# Patient Record
Sex: MALE | Race: WHITE | NOT HISPANIC OR LATINO | Employment: FULL TIME | URBAN - METROPOLITAN AREA
[De-identification: names, ages, dates, MRNs, and addresses within clinical notes are randomized per-mention and may not be internally consistent; named-entity substitution may affect disease eponyms.]

---

## 2017-01-02 ENCOUNTER — MYC MEDICAL ADVICE (OUTPATIENT)
Dept: FAMILY MEDICINE | Facility: CLINIC | Age: 37
End: 2017-01-02

## 2017-01-06 ENCOUNTER — OFFICE VISIT (OUTPATIENT)
Dept: FAMILY MEDICINE | Facility: CLINIC | Age: 37
End: 2017-01-06
Payer: COMMERCIAL

## 2017-01-06 VITALS
TEMPERATURE: 97.1 F | HEART RATE: 94 BPM | SYSTOLIC BLOOD PRESSURE: 116 MMHG | HEIGHT: 70 IN | OXYGEN SATURATION: 94 % | RESPIRATION RATE: 18 BRPM | WEIGHT: 234 LBS | DIASTOLIC BLOOD PRESSURE: 78 MMHG | BODY MASS INDEX: 33.5 KG/M2

## 2017-01-06 DIAGNOSIS — S93.402S SPRAIN OF LEFT ANKLE, UNSPECIFIED LIGAMENT, SEQUELA: Primary | ICD-10-CM

## 2017-01-06 PROCEDURE — 99213 OFFICE O/P EST LOW 20 MIN: CPT | Performed by: FAMILY MEDICINE

## 2017-01-06 ASSESSMENT — PAIN SCALES - GENERAL: PAINLEVEL: MODERATE PAIN (4)

## 2017-01-06 NOTE — MR AVS SNAPSHOT
"              After Visit Summary   1/6/2017    Tereso Erickson    MRN: [id number]           Patient Information     Date Of Birth          1980        Visit Information        Provider Department      1/6/2017 1:00 PM Noe Ernst MD [facility]         Follow-ups after your visit        Who to contact     If you have questions or need follow up information about today's clinic visit or your schedule please contact [facility] directly at [phone].  Normal or non-critical lab and imaging results will be communicated to you by MyChart, letter or phone within 4 business days after the clinic has received the results. If you do not hear from us within 7 days, please contact the clinic through [employer] or phone. If you have a critical or abnormal lab result, we will notify you by phone as soon as possible.  Submit refill requests through [employer] or call your pharmacy and they will forward the refill request to us. Please allow 3 business days for your refill to be completed.          Additional Information About Your Visit        MyChart Information     [employer] gives you secure access to your electronic health record. If you see a primary care provider, you can also send messages to your care team and make appointments. If you have questions, please call your primary care clinic.  If you do not have a primary care provider, please call [phone] and they will assist you.        Care EveryWhere ID     This is your Care EveryWhere ID. This could be used by other organizations to access your [facility] medical records  [id number]        Your Vitals Were     Pulse Temperature Respirations Height BMI (Body Mass Index) Pulse Oximetry    94 97.1  F (36.2  C) (Tympanic) 18 5' 9.8\" (1.773 m) 33.77 kg/m2 94%       Blood Pressure from Last 3 Encounters:   01/06/17 116/78   01/28/16 126/74   01/09/16 119/79    Weight from Last 3 Encounters:   01/06/17 234 lb (106.142 kg)   01/28/16 " 230 lb (104.327 kg)   01/09/16 215 lb (97.523 kg)              Today, you had the following     No orders found for display       Primary Care Provider Office Phone # Fax #    Noe Ernst [phone]       [facility] [address] [place] [address]        Thank you!     Thank you for choosing [facility]  for your care. Our goal is always to provide you with excellent care. Hearing back from our patients is one way we can continue to improve our services. Please take a few minutes to complete the written survey that you may receive in the mail after your visit with us. Thank you!             Your Updated Medication List - Protect others around you: Learn how to safely use, store and throw away your medicines at www.disposemymeds.org.          This list is accurate as of: 1/6/17  4:34 PM.  Always use your most recent med list.                   Brand Name Dispense Instructions for use    ACETAMINOPHEN PO      Take 650 mg by mouth every 4 hours as needed for pain       IBUPROFEN PO

## 2017-01-06 NOTE — PROGRESS NOTES
"  SUBJECTIVE:                                                    Tereso Erickson is a 36 year old male who presents to clinic today for the following health issues:      Joint Pain     Onset: 1 week      Description:   Location: left foot   Character: Sharp, Dull ache and Stabbing    Intensity: moderate    Progression of Symptoms: better    Accompanying Signs & Symptoms:  Other symptoms: numbness and tingling   History:   Previous similar pain: no       Precipitating factors:   Trauma or overuse: no     Alleviating factors:  Improved by: rest/inactivity       Therapies Tried and outcome: ibuprofen ,ice    Ran on the tredmil on 12-31-16 (6 days ago), no issues, went to the grocery store, and stepped to put his son in the car and he felt like a spike went through it. Sharp pain, burning/hot.    It was painful for awhile, but, the pain went away, they went bowling same day and he was still fine.    Woke up next day and it was hard to walk and very painful     He states that it varies from day to day how long he goes without pain.  The majority of the pain is on the lateral aspect of the ankle.  Some pain at the plantar aspect of his foot at the origin of the plantar fascia.      He has tried ice, and he doesn't feel any difference.     He has no pain while at rest, but, varying degrees of pain while standing or walking.  Pain is usually worse when he is on his foot longer and longer.  Rarely does he have pain with first step after awakening or after long periods of inactivity.      Left foot pain is Gradually getting better.    Patient notes that he has had multiple previous left ankle sprains.  Nothing recently.      ROS:  General: negative  Musculoskeletal: positive for negative and foot pain    Review of systems assessed and negative except as stated above.     OBJECTIVE:                                                    /78 mmHg  Pulse 94  Temp(Src) 97.1  F (36.2  C) (Tympanic)  Resp 18  Ht 5' 9.8\" (1.773 " m)  Wt 234 lb (106.142 kg)  BMI 33.77 kg/m2  SpO2 94%  Body mass index is 33.77 kg/(m^2).  GENERAL APPEARANCE: healthy, alert and no distress  ORTHO: Foot Exam: Inspection:  no swelling  Tender::  At origin of plantar fascia as well as lateral malleolus (pain is worse here).    Range of Motion:flexion of toes:  full, pain-free, extension of toes  full, pain-free          ASSESSMENT/PLAN:                                                        ICD-10-CM    1. Sprain of left ankle, unspecified ligament, sequela S93.402S      PLAN:  1. Patient likely has sequela of ankle that has sustained multiple ankle sprains.  He is experiencing pain as a result of a poorly rehabbed ankle.  Recommend that if he wants to continue to do treadmill, he will need to go to physical therapy for strengthening of his ankle.  He will think about this and let me know if he wishes to get a referral to physical therapy.    Follow up with me as needed.    This document serves as a record of the services and decisions personally performed and made by Noe Ernst MD.It was created on his behalf by Lalita Ramos,  trained medical scribes. The creation of this document is based the provider's statements to the medical scribe. January 6, 2017 1:28 PM    The information in this document, created by the medical scribe for me, accurately reflects the services I personally performed and the decisions made by me. I have reviewed and approved this document for accuracy.     Noe Ernst MD   [facility]

## 2017-01-06 NOTE — NURSING NOTE
"Chief Complaint   Patient presents with     Musculoskeletal Problem     left foot pain       Initial There were no vitals taken for this visit. Estimated body mass index is 33.95 kg/(m^2) as calculated from the following:    Height as of 1/28/16: 5' 9\" (1.753 m).    Weight as of 1/28/16: 230 lb (104.327 kg).  BP completed using cuff size: chris Vasquez MA      "

## 2017-10-11 ENCOUNTER — MYC MEDICAL ADVICE (OUTPATIENT)
Dept: FAMILY MEDICINE | Facility: CLINIC | Age: 37
End: 2017-10-11

## 2017-10-11 ENCOUNTER — OFFICE VISIT (OUTPATIENT)
Dept: FAMILY MEDICINE | Facility: CLINIC | Age: 37
End: 2017-10-11
Payer: COMMERCIAL

## 2017-10-11 VITALS
TEMPERATURE: 97.6 F | WEIGHT: 239.6 LBS | OXYGEN SATURATION: 95 % | BODY MASS INDEX: 34.58 KG/M2 | SYSTOLIC BLOOD PRESSURE: 110 MMHG | DIASTOLIC BLOOD PRESSURE: 70 MMHG | HEART RATE: 60 BPM

## 2017-10-11 DIAGNOSIS — H66.006 RECURRENT ACUTE SUPPURATIVE OTITIS MEDIA WITHOUT SPONTANEOUS RUPTURE OF TYMPANIC MEMBRANE OF BOTH SIDES: Primary | ICD-10-CM

## 2017-10-11 DIAGNOSIS — M77.02 MEDIAL EPICONDYLITIS OF ELBOW, LEFT: ICD-10-CM

## 2017-10-11 DIAGNOSIS — M77.01 MEDIAL EPICONDYLITIS OF ELBOW, RIGHT: ICD-10-CM

## 2017-10-11 PROCEDURE — 99214 OFFICE O/P EST MOD 30 MIN: CPT | Performed by: PHYSICIAN ASSISTANT

## 2017-10-11 ASSESSMENT — ENCOUNTER SYMPTOMS
DYSURIA: 0
EYE REDNESS: 0
SEIZURES: 0
FREQUENCY: 0
BLOOD IN STOOL: 0
MYALGIAS: 0
DIARRHEA: 0
DIAPHORESIS: 0
EYE PAIN: 0
COUGH: 0
LOSS OF CONSCIOUSNESS: 0
DOUBLE VISION: 0
DIZZINESS: 0
PHOTOPHOBIA: 0
NEUROLOGICAL NEGATIVE: 1
EYE DISCHARGE: 0
BLURRED VISION: 0
HALLUCINATIONS: 0
ABDOMINAL PAIN: 0
WEIGHT LOSS: 0
NAUSEA: 0
PALPITATIONS: 0
INSOMNIA: 0
NECK PAIN: 0
FEVER: 0
HEARTBURN: 0
BACK PAIN: 0
DEPRESSION: 0
FOCAL WEAKNESS: 0
TINGLING: 0
SORE THROAT: 0
WHEEZING: 0
NERVOUS/ANXIOUS: 0
SENSORY CHANGE: 0
HEMOPTYSIS: 0
SPUTUM PRODUCTION: 0
HEADACHES: 0
WEAKNESS: 0
ORTHOPNEA: 0
CONSTIPATION: 0
SHORTNESS OF BREATH: 0
VOMITING: 0

## 2017-10-11 ASSESSMENT — LIFESTYLE VARIABLES: SUBSTANCE_ABUSE: 0

## 2017-10-11 ASSESSMENT — PAIN SCALES - GENERAL: PAINLEVEL: SEVERE PAIN (7)

## 2017-10-11 NOTE — NURSING NOTE
"Chief Complaint   Patient presents with     Headache     Elbow Pain       Initial /70 (BP Location: Right arm, Patient Position: Chair, Cuff Size: Adult Large)  Pulse 60  Temp 97.6  F (36.4  C) (Tympanic)  Wt 239 lb 9.6 oz (108.7 kg)  SpO2 95%  BMI 34.58 kg/m2 Estimated body mass index is 34.58 kg/(m^2) as calculated from the following:    Height as of 1/6/17: 5' 9.8\" (1.773 m).    Weight as of this encounter: 239 lb 9.6 oz (108.7 kg).  Medication Reconciliation: complete    Health Maintenance that is potentially due pending provider review:  NONE    n/a    Is there anyone who you would like to be able to receive your results? Yes  If yes have patient fill out VIKRAM    Debbie BECK CMA    "

## 2017-10-11 NOTE — MR AVS SNAPSHOT
After Visit Summary   10/11/2017    Tereso Erickson    MRN: [id number]           Patient Information     Date Of Birth          1980        Visit Information        Provider Department      10/11/2017 2:40 PM Parvez Patterson PA-C [facility]        Today's Diagnoses     Recurrent acute suppurative otitis media without spontaneous rupture of tympanic membrane of both sides    -  1    Medial epicondylitis of elbow, right        Medial epicondylitis of elbow, left           Follow-ups after your visit        Follow-up notes from your care team     Return if symptoms worsen or fail to improve.      Who to contact     If you have questions or need follow up information about today's clinic visit or your schedule please contact [facility] directly at [phone].  Normal or non-critical lab and imaging results will be communicated to you by MyChart, letter or phone within 4 business days after the clinic has received the results. If you do not hear from us within 7 days, please contact the clinic through MyChart or phone. If you have a critical or abnormal lab result, we will notify you by phone as soon as possible.  Submit refill requests through [employer] or call your pharmacy and they will forward the refill request to us. Please allow 3 business days for your refill to be completed.          Additional Information About Your Visit        MyChart Information     [employer] gives you secure access to your electronic health record. If you see a primary care provider, you can also send messages to your care team and make appointments. If you have questions, please call your primary care clinic.  If you do not have a primary care provider, please call [phone] and they will assist you.        Care EveryWhere ID     This is your Care EveryWhere ID. This could be used by other organizations to access your [place] medical records  [id number]        Your Vitals Were      Pulse Temperature Pulse Oximetry BMI (Body Mass Index)          60 97.6  F (36.4  C) (Tympanic) 95% 34.58 kg/m2         Blood Pressure from Last 3 Encounters:   10/11/17 110/70   01/06/17 116/78   01/28/16 126/74    Weight from Last 3 Encounters:   10/11/17 239 lb 9.6 oz (108.7 kg)   01/06/17 234 lb (106.1 kg)   01/28/16 230 lb (104.3 kg)              Today, you had the following     No orders found for display         Today's Medication Changes          These changes are accurate as of: 10/11/17  4:42 PM.  If you have any questions, ask your nurse or doctor.               Start taking these medicines.        Dose/Directions    amoxicillin-clavulanate 875-125 MG per tablet   Commonly known as:  AUGMENTIN   Used for:  Recurrent acute suppurative otitis media without spontaneous rupture of tympanic membrane of both sides   Started by:  Parvez Patterson PA-C        Dose:  1 tablet   Take 1 tablet by mouth 2 times daily   Quantity:  20 tablet   Refills:  0       diclofenac 50 MG EC tablet   Commonly known as:  VOLTAREN   Used for:  Medial epicondylitis of elbow, right, Medial epicondylitis of elbow, left   Started by:  Parvez Patterson PA-C        Dose:  50 mg   Take 1 tablet (50 mg) by mouth 3 times daily as needed for moderate pain   Quantity:  60 tablet   Refills:  1            Where to get your medicines      These medications were sent to [address] [address]     Phone:  [phone]     amoxicillin-clavulanate 875-125 MG per tablet    diclofenac 50 MG EC tablet                Primary Care Provider Office Phone # Fax #    Noe Ernst [phone]       [address] [place] [address]        Equal Access to Services     [facility] OPAL AH: Jey Rodriguez, wanishant mcmillan, qaybta kaalmaadeola wagner, trini reeves. So [facility] [phone].    ATENCIÓN: Misti hopkins  español, tiene a smith disposición servicios gratuitos de asistencia lingüística. Sp brennan [phone].    We comply with applicable federal civil rights laws and [place] laws. We do not discriminate on the basis of race, color, national origin, age, disability, sex, sexual orientation, or gender identity.            Thank you!     Thank you for choosing [facility]  for your care. Our goal is always to provide you with excellent care. Hearing back from our patients is one way we can continue to improve our services. Please take a few minutes to complete the written survey that you may receive in the mail after your visit with us. Thank you!             Your Updated Medication List - Protect others around you: Learn how to safely use, store and throw away your medicines at www.disposemymeds.org.          This list is accurate as of: 10/11/17  4:42 PM.  Always use your most recent med list.                   Brand Name Dispense Instructions for use Diagnosis    ACETAMINOPHEN PO      Take 650 mg by mouth every 4 hours as needed for pain        amoxicillin-clavulanate 875-125 MG per tablet    AUGMENTIN    20 tablet    Take 1 tablet by mouth 2 times daily    Recurrent acute suppurative otitis media without spontaneous rupture of tympanic membrane of both sides       diclofenac 50 MG EC tablet    VOLTAREN    60 tablet    Take 1 tablet (50 mg) by mouth 3 times daily as needed for moderate pain    Medial epicondylitis of elbow, right, Medial epicondylitis of elbow, left       IBUPROFEN PO

## 2017-10-11 NOTE — TELEPHONE ENCOUNTER
Rn called left message on identified phone that he should probably be seen for his concern. Dr. Ernst has some maddie available tomorrow. He should call and schedule appt or if questions, call back and ask to speak with the nurse., [phone].....................JOSEPH Alexandra

## 2017-10-11 NOTE — PROGRESS NOTES
HPI    SUBJECTIVE:   Tereso Erickson is a 37 year old male who presents to clinic today for ear pain is present for the past 5-7 days. Mainly on the right side. had no fevers associated with this.  He also has bilateral elbow pain and he points to the medial epicondyles bilaterally and states that this started after he was skipping stones and he was using both arms.    ENT Symptoms             Symptoms: cc Present Absent Comment   Fever/Chills   x    Fatigue  x     Muscle Aches   x    Eye Irritation   x    Sneezing   x    Nasal Jalen/Drg  x     Sinus Pressure/Pain  x  Some. Patient states that he has had a constant headache for about 2 weeks. Says that at times feels like a migraine    Loss of smell   x    Dental pain   x    Sore Throat  x     Swollen Glands   x    Ear Pain/Fullness  x  Right ear. Throbbing    Cough   x    Wheeze   x    Chest Pain   x    Shortness of breath   x    Rash       Other         Symptom duration: Since Thursday last week    Symptom severity:     Treatments tried:     Contacts:  Family         Musculoskeletal problem/pain      Duration: Since about Mid August     Description  Location: Insides of both elbows. Right seems to be worse     Intensity:  moderate, severe    Accompanying signs and symptoms: none    History  Previous similar problem: no   Previous evaluation:  none    Precipitating or alleviating factors:  Trauma or overuse: YES  Aggravating factors include: none    Therapies tried and outcome: nothing    Problem list and histories reviewed & adjusted, as indicated.  Additional history: as documented    Patient Active Problem List   Diagnosis     GERD (gastroesophageal reflux disease)     CARDIOVASCULAR SCREENING; LDL GOAL LESS THAN 160     Mac's neuroma     Impingement syndrome of shoulder region     Strain of tendon of right rotator cuff     Past Surgical History:   Procedure Laterality Date     BIOPSY/REMOVAL, LYMPH NODE(S)      groin area at age 19-benign       Social History    Substance Use Topics     Smoking status: Former Smoker     Quit date: 1/1/2005     Smokeless tobacco: Former User     Quit date: 1/1/2005     Alcohol use Yes      Comment: occasionally a beer once a month     Family History   Problem Relation Age of Onset     GASTROINTESTINAL DISEASE Mother      Crohn's     Cancer - colorectal Mother      Prostate Cancer Mother      CEREBROVASCULAR DISEASE Paternal Grandmother      CANCER Maternal Grandmother      lymphoma     Respiratory Maternal Grandfather      COPD         Current Outpatient Prescriptions   Medication Sig Dispense Refill     amoxicillin-clavulanate (AUGMENTIN) 875-125 MG per tablet Take 1 tablet by mouth 2 times daily 20 tablet 0     diclofenac (VOLTAREN) 50 MG EC tablet Take 1 tablet (50 mg) by mouth 3 times daily as needed for moderate pain 60 tablet 1     ACETAMINOPHEN PO Take 650 mg by mouth every 4 hours as needed for pain       IBUPROFEN PO        No Known Allergies  Labs reviewed in EPIC      Reviewed and updated as needed this visit by clinical staff     Reviewed and updated as needed this visit by Provider       Review of Systems   Constitutional: Negative for diaphoresis, fever, malaise/fatigue and weight loss.   HENT: Positive for congestion and ear pain. Negative for ear discharge, hearing loss, nosebleeds and sore throat.    Eyes: Negative for blurred vision, double vision, photophobia, pain, discharge and redness.   Respiratory: Negative for cough, hemoptysis, sputum production, shortness of breath and wheezing.    Cardiovascular: Negative for chest pain, palpitations, orthopnea and leg swelling.   Gastrointestinal: Negative for abdominal pain, blood in stool, constipation, diarrhea, heartburn, melena, nausea and vomiting.   Genitourinary: Negative.  Negative for dysuria, frequency and urgency.   Musculoskeletal: Positive for joint pain. Negative for back pain, myalgias and neck pain.   Skin: Negative for itching and rash.   Neurological:  Negative.  Negative for dizziness, tingling, sensory change, focal weakness, seizures, loss of consciousness, weakness and headaches.   Endo/Heme/Allergies: Negative.    Psychiatric/Behavioral: Negative for depression, hallucinations, substance abuse and suicidal ideas. The patient is not nervous/anxious and does not have insomnia.          Physical Exam   Constitutional: He is oriented to person, place, and time and well-developed, well-nourished, and in no distress.   HENT:   Head: Normocephalic and atraumatic.   Right Ear: No drainage or tenderness. Tympanic membrane is injected, erythematous and bulging. Tympanic membrane mobility is abnormal. A middle ear effusion is present.   Left Ear: External ear normal. No drainage or tenderness. Tympanic membrane is injected, erythematous and bulging. Tympanic membrane mobility is abnormal. A middle ear effusion is present.   Nose: Nose normal.   Mouth/Throat: Oropharyngeal exudate, posterior oropharyngeal edema and posterior oropharyngeal erythema present.   Eyes: Conjunctivae and EOM are normal. Pupils are equal, round, and reactive to light. Right eye exhibits no discharge. Left eye exhibits no discharge. No scleral icterus.   Neck: Normal range of motion. Neck supple. No thyromegaly present.   Cardiovascular: Normal rate, regular rhythm, normal heart sounds and intact distal pulses.  Exam reveals no gallop and no friction rub.    No murmur heard.  Pulmonary/Chest: Effort normal and breath sounds normal. No respiratory distress. He has no wheezes. He has no rales. He exhibits no tenderness.   Abdominal: Soft. Bowel sounds are normal. He exhibits no distension and no mass. There is no tenderness. There is no rebound and no guarding.   Musculoskeletal: Normal range of motion. He exhibits no edema.        Right elbow: Tenderness found. Medial epicondyle tenderness noted.        Left elbow: Tenderness found. Medial epicondyle tenderness noted.   Lymphadenopathy:     He has  no cervical adenopathy.   Neurological: He is alert and oriented to person, place, and time. He has normal reflexes. No cranial nerve deficit. He exhibits normal muscle tone. Gait normal. Coordination normal.   Skin: Skin is warm and dry. No rash noted. No erythema.   Psychiatric: Mood, memory, affect and judgment normal.       (H66.006) Recurrent acute suppurative otitis media without spontaneous rupture of tympanic membrane of both sides  (primary encounter diagnosis)  Comment:   Plan: amoxicillin-clavulanate (AUGMENTIN) 875-125 MG         per tablet            (M77.01) Medial epicondylitis of elbow, right  Comment:   Plan: diclofenac (VOLTAREN) 50 MG EC tablet            (M77.02) Medial epicondylitis of elbow, left  Comment:   Plan: diclofenac (VOLTAREN) 50 MG EC tablet           Follow up if symptoms are not improving. We discussed possible physical therapy although pain is not improving.

## 2017-10-12 NOTE — TELEPHONE ENCOUNTER
Responded via My Chart and message left on identified voicemail to call if questions or to schedule an appt.   Daysi Payne RN

## 2017-12-18 ENCOUNTER — OFFICE VISIT (OUTPATIENT)
Dept: FAMILY MEDICINE | Facility: CLINIC | Age: 37
End: 2017-12-18
Payer: COMMERCIAL

## 2017-12-18 VITALS
HEART RATE: 78 BPM | BODY MASS INDEX: 34.2 KG/M2 | TEMPERATURE: 98 F | RESPIRATION RATE: 18 BRPM | SYSTOLIC BLOOD PRESSURE: 118 MMHG | WEIGHT: 237 LBS | OXYGEN SATURATION: 98 % | DIASTOLIC BLOOD PRESSURE: 72 MMHG

## 2017-12-18 DIAGNOSIS — R10.32 LLQ ABDOMINAL PAIN: ICD-10-CM

## 2017-12-18 DIAGNOSIS — K21.9 GASTROESOPHAGEAL REFLUX DISEASE WITHOUT ESOPHAGITIS: ICD-10-CM

## 2017-12-18 DIAGNOSIS — R10.12 LUQ ABDOMINAL PAIN: Primary | ICD-10-CM

## 2017-12-18 PROCEDURE — 99213 OFFICE O/P EST LOW 20 MIN: CPT | Performed by: FAMILY MEDICINE

## 2017-12-18 ASSESSMENT — PAIN SCALES - GENERAL: PAINLEVEL: NO PAIN (0)

## 2017-12-18 NOTE — PROGRESS NOTES
SUBJECTIVE:   Tereso Erickson is a 37 year old male who presents to clinic today for the following health issues:      ABDOMINAL   PAIN     Onset: Solid pain, pressure 1 week.     Description:   Character: Dull ache, Stabbing and Fullness  Location: epigastric region left upper quadrant  Radiation: Back and Shoulder    Intensity: mild    Progression of Symptoms:  worsening    Accompanying Signs & Symptoms:  Fever/Chills?: no   Gas/Bloating: YES  Nausea: YES  Vomitting: no   Diarrhea?: no   Constipation:no   Dysuria or Hematuria: no    History:   Trauma: no   Previous similar pain: no    Previous tests done: none    Precipitating factors:   Does the pain change with:     Food: no      BM: no     Urination: no     Alleviating factors:  Tums- calms the acid part.     Therapies Tried and outcome:     LMP:  not applicable       Problem list and histories reviewed & adjusted, as indicated.  Additional history: as documented    Reviewed and updated as needed this visit by clinical staffTobacco  Allergies  Meds  Problems  Med Hx  Surg Hx  Fam Hx  Soc Hx        Reviewed and updated as needed this visit by Provider  Allergies  Meds  Problems         Patient here to review above symptoms that started 1 week ago.  Patient has no previous history of heartburn.  He notes improvement with Tums.  However, she continues to have pain and this is concerning to him.  He notes that in addition to epigastric pain he also has left lower quadrant and left upper quadrant pain.  Patient reports no fevers, chills, vomiting.  No history of constipation or diarrhea or hematochezia.    Patient reports no melena, hematochezia, nausea/vomiting, difficulty swallowing, or sensations of food getting stuck.    Social History   Substance Use Topics     Smoking status: Former Smoker     Quit date: 1/1/2005     Smokeless tobacco: Former User     Quit date: 1/1/2005     Alcohol use Yes      Comment: occasionally a beer once a month         ROS:  10 point ROS of systems including Constitutional, Eyes, HENT, Respiratory, Cardiovascular, Gastroenterology, Genitourinary, Integumentary, Muscularskeletal, Psychiatric were all negative except for pertinent positives noted in my HPI.     OBJECTIVE:   /72  Pulse 78  Temp 98  F (36.7  C) (Temporal)  Resp 18  Wt 237 lb (107.5 kg)  SpO2 98%  BMI 34.2 kg/m2  Body mass index is 34.2 kg/(m^2).  Physical Exam   Constitutional: He appears well-developed and well-nourished.   Cardiovascular: Normal rate, regular rhythm, S1 normal, S2 normal and normal heart sounds.    No murmur heard.  Pulmonary/Chest: Effort normal and breath sounds normal. No respiratory distress. He has no wheezes. He has no rhonchi. He has no rales.   Abdominal: Soft. Normal appearance and bowel sounds are normal. He exhibits no mass. There is no hepatosplenomegaly. There is tenderness in the left upper quadrant and left lower quadrant. There is no rigidity, no rebound, no guarding, no CVA tenderness, no tenderness at McBurney's point and negative Bui's sign.   Negative for right lower quadrant, right upper quadrant and epigastric pain.     Neurological: He is alert.       ASSESSMENT/PLAN:       ICD-10-CM    1. LUQ abdominal pain R10.12    2. LLQ abdominal pain R10.32    3. Gastroesophageal reflux disease without esophagitis K21.9      PLAN:  1.  For now, he will monitor symptoms.  He will take ranitidine twice daily for 1-2 weeks to see if this helps his reported GERD symptoms.  Also recommended Miralax 1 capful daily for 1-2 weeks with increased fluid intake to see if constipation is causing his symptoms.     2.  If symptoms worsen or if he starts to get fevers or flu-like symptoms with the left lower quadrant pain, I would recommend that he follow-up for CRP, CBC with diff, lipase, and possibly CT of abdomen/pelvis for rule out of diverticulitis.  At this point, given that he feels relatively well (with exception of left lower  quadrant and left upper quadrant pain with lower left rib cage pain) I do not feel he needs additional workup at this time.    Follow up with Provider - in next 1-2 weeks if symptoms do not improve, sooner if they worsen (with follow-up labs and imaging as noted above).     Noe Ernst MD   [facility]

## 2017-12-18 NOTE — NURSING NOTE
"Chief Complaint   Patient presents with     Abdominal Pain     stomach issues, no appetite,        Initial /72  Pulse 78  Temp 98  F (36.7  C) (Temporal)  Resp 18  Wt 237 lb (107.5 kg)  SpO2 98%  BMI 34.2 kg/m2 Estimated body mass index is 34.2 kg/(m^2) as calculated from the following:    Height as of 1/6/17: 5' 9.8\" (1.773 m).    Weight as of this encounter: 237 lb (107.5 kg).  Medication Reconciliation: complete    "

## 2017-12-18 NOTE — PATIENT INSTRUCTIONS
Ranitidine 150 mg twice daily for the next 1-2 weeks.    1 capful of Miralax daily with  ounces of water per day.

## 2017-12-18 NOTE — MR AVS SNAPSHOT
After Visit Summary   12/18/2017    Tereso Erickson    MRN: [id number]           Patient Information     Date Of Birth          1980        Visit Information        Provider Department      12/18/2017 9:00 AM Noe Ernst MD [facility]        Today's Diagnoses     LUQ abdominal pain    -  1    LLQ abdominal pain        Gastroesophageal reflux disease without esophagitis          Care Instructions    Ranitidine 150 mg twice daily for the next 1-2 weeks.    1 capful of Miralax daily with  ounces of water per day.            Follow-ups after your visit        Who to contact     If you have questions or need follow up information about today's clinic visit or your schedule please contact [facility] directly at [phone].  Normal or non-critical lab and imaging results will be communicated to you by [employer], letter or phone within 4 business days after the clinic has received the results. If you do not hear from us within 7 days, please contact the clinic through [employer] or phone. If you have a critical or abnormal lab result, we will notify you by phone as soon as possible.  Submit refill requests through [employer] or call your pharmacy and they will forward the refill request to us. Please allow 3 business days for your refill to be completed.          Additional Information About Your Visit        MyChart Information     [employer] gives you secure access to your electronic health record. If you see a primary care provider, you can also send messages to your care team and make appointments. If you have questions, please call your primary care clinic.  If you do not have a primary care provider, please call [phone] and they will assist you.        Care EveryWhere ID     This is your Care EveryWhere ID. This could be used by other organizations to access your [place] medical records  [id number]        Your Vitals Were     Pulse Temperature  Respirations Pulse Oximetry BMI (Body Mass Index)       78 98  F (36.7  C) (Temporal) 18 98% 34.2 kg/m2        Blood Pressure from Last 3 Encounters:   12/18/17 118/72   10/11/17 110/70   01/06/17 116/78    Weight from Last 3 Encounters:   12/18/17 237 lb (107.5 kg)   10/11/17 239 lb 9.6 oz (108.7 kg)   01/06/17 234 lb (106.1 kg)              Today, you had the following     No orders found for display         Today's Medication Changes          These changes are accurate as of: 12/18/17  9:54 AM.  If you have any questions, ask your nurse or doctor.               Stop taking these medicines if you haven't already. Please contact your care team if you have questions.     amoxicillin-clavulanate 875-125 MG per tablet   Commonly known as:  AUGMENTIN   Stopped by:  Noe Ernst MD                    Primary Care Provider Office Phone # Fax #    Noe Ernst [phone] [address] [place] [address]        Equal Access to Services     [facility]: Hadii spencer ku hadasho Soomaali, waaxda luqadaha, qaybta kaalmada kristy, trini quinonez . So [facility] [phone].    ATENCIÓN: Si habla español, tiene a smith disposición servicios gratuitos de asistencia lingüística. [facility] [phone].    We comply with applicable federal civil rights laws and [place] laws. We do not discriminate on the basis of race, color, national origin, age, disability, sex, sexual orientation, or gender identity.            Thank you!     Thank you for choosing [facility]  for your care. Our goal is always to provide you with excellent care. Hearing back from our patients is one way we can continue to improve our services. Please take a few minutes to complete the written survey that you may receive in the mail after your visit with us. Thank you!             Your Updated Medication List - Protect others around you: Learn how to safely use, store and throw  away your medicines at www.disposemymeds.org.          This list is accurate as of: 12/18/17  9:54 AM.  Always use your most recent med list.                   Brand Name Dispense Instructions for use Diagnosis    ACETAMINOPHEN PO      Take 650 mg by mouth every 4 hours as needed for pain        diclofenac 50 MG EC tablet    VOLTAREN    60 tablet    Take 1 tablet (50 mg) by mouth 3 times daily as needed for moderate pain    Medial epicondylitis of elbow, right, Medial epicondylitis of elbow, left       IBUPROFEN PO

## 2018-03-27 ENCOUNTER — MYC MEDICAL ADVICE (OUTPATIENT)
Dept: FAMILY MEDICINE | Facility: CLINIC | Age: 38
End: 2018-03-27

## 2018-03-27 ENCOUNTER — OFFICE VISIT (OUTPATIENT)
Dept: FAMILY MEDICINE | Facility: CLINIC | Age: 38
End: 2018-03-27
Payer: COMMERCIAL

## 2018-03-27 VITALS
HEART RATE: 63 BPM | OXYGEN SATURATION: 99 % | BODY MASS INDEX: 35.55 KG/M2 | SYSTOLIC BLOOD PRESSURE: 128 MMHG | TEMPERATURE: 98.5 F | RESPIRATION RATE: 18 BRPM | DIASTOLIC BLOOD PRESSURE: 74 MMHG | HEIGHT: 69 IN | WEIGHT: 240 LBS

## 2018-03-27 DIAGNOSIS — R10.12 LUQ ABDOMINAL PAIN: Primary | ICD-10-CM

## 2018-03-27 DIAGNOSIS — K21.9 GASTROESOPHAGEAL REFLUX DISEASE WITHOUT ESOPHAGITIS: ICD-10-CM

## 2018-03-27 DIAGNOSIS — Z13.220 LIPID SCREENING: ICD-10-CM

## 2018-03-27 DIAGNOSIS — R07.89 LEFT-SIDED CHEST WALL PAIN: ICD-10-CM

## 2018-03-27 DIAGNOSIS — E66.01 SEVERE OBESITY (BMI 35.0-39.9) WITH COMORBIDITY (H): ICD-10-CM

## 2018-03-27 LAB
ALBUMIN SERPL-MCNC: 4.4 G/DL (ref 3.4–5)
ALP SERPL-CCNC: 66 U/L (ref 40–150)
ALT SERPL W P-5'-P-CCNC: 53 U/L (ref 0–70)
ANION GAP SERPL CALCULATED.3IONS-SCNC: 7 MMOL/L (ref 3–14)
AST SERPL W P-5'-P-CCNC: 23 U/L (ref 0–45)
BASOPHILS # BLD AUTO: 0.1 10E9/L (ref 0–0.2)
BASOPHILS NFR BLD AUTO: 0.9 %
BILIRUB SERPL-MCNC: 0.8 MG/DL (ref 0.2–1.3)
BUN SERPL-MCNC: 10 MG/DL (ref 7–30)
CALCIUM SERPL-MCNC: 8.8 MG/DL (ref 8.5–10.1)
CHLORIDE SERPL-SCNC: 106 MMOL/L (ref 94–109)
CHOLEST SERPL-MCNC: 161 MG/DL
CO2 SERPL-SCNC: 28 MMOL/L (ref 20–32)
CREAT SERPL-MCNC: 0.77 MG/DL (ref 0.66–1.25)
CRP SERPL-MCNC: <2.9 MG/L (ref 0–8)
DIFFERENTIAL METHOD BLD: NORMAL
EOSINOPHIL # BLD AUTO: 0.2 10E9/L (ref 0–0.7)
EOSINOPHIL NFR BLD AUTO: 2.1 %
ERYTHROCYTE [DISTWIDTH] IN BLOOD BY AUTOMATED COUNT: 12.9 % (ref 10–15)
GFR SERPL CREATININE-BSD FRML MDRD: >90 ML/MIN/1.7M2
GLUCOSE SERPL-MCNC: 95 MG/DL (ref 70–99)
HCT VFR BLD AUTO: 46.9 % (ref 40–53)
HDLC SERPL-MCNC: 34 MG/DL
HGB BLD-MCNC: 16.4 G/DL (ref 13.3–17.7)
IMM GRANULOCYTES # BLD: 0 10E9/L (ref 0–0.4)
IMM GRANULOCYTES NFR BLD: 0.4 %
LDLC SERPL CALC-MCNC: 53 MG/DL
LIPASE SERPL-CCNC: 154 U/L (ref 73–393)
LYMPHOCYTES # BLD AUTO: 2.6 10E9/L (ref 0.8–5.3)
LYMPHOCYTES NFR BLD AUTO: 33.4 %
MCH RBC QN AUTO: 31 PG (ref 26.5–33)
MCHC RBC AUTO-ENTMCNC: 35 G/DL (ref 31.5–36.5)
MCV RBC AUTO: 89 FL (ref 78–100)
MONOCYTES # BLD AUTO: 0.7 10E9/L (ref 0–1.3)
MONOCYTES NFR BLD AUTO: 8.3 %
NEUTROPHILS # BLD AUTO: 4.3 10E9/L (ref 1.6–8.3)
NEUTROPHILS NFR BLD AUTO: 54.9 %
NONHDLC SERPL-MCNC: 127 MG/DL
PLATELET # BLD AUTO: 235 10E9/L (ref 150–450)
POTASSIUM SERPL-SCNC: 4 MMOL/L (ref 3.4–5.3)
PROT SERPL-MCNC: 7.7 G/DL (ref 6.8–8.8)
RBC # BLD AUTO: 5.29 10E12/L (ref 4.4–5.9)
SODIUM SERPL-SCNC: 141 MMOL/L (ref 133–144)
TRIGL SERPL-MCNC: 370 MG/DL
WBC # BLD AUTO: 7.9 10E9/L (ref 4–11)

## 2018-03-27 PROCEDURE — 86140 C-REACTIVE PROTEIN: CPT | Performed by: FAMILY MEDICINE

## 2018-03-27 PROCEDURE — 80053 COMPREHEN METABOLIC PANEL: CPT | Performed by: FAMILY MEDICINE

## 2018-03-27 PROCEDURE — 99214 OFFICE O/P EST MOD 30 MIN: CPT | Performed by: FAMILY MEDICINE

## 2018-03-27 PROCEDURE — 83690 ASSAY OF LIPASE: CPT | Performed by: FAMILY MEDICINE

## 2018-03-27 PROCEDURE — 80061 LIPID PANEL: CPT | Performed by: FAMILY MEDICINE

## 2018-03-27 PROCEDURE — 85025 COMPLETE CBC W/AUTO DIFF WBC: CPT | Performed by: FAMILY MEDICINE

## 2018-03-27 PROCEDURE — 36415 COLL VENOUS BLD VENIPUNCTURE: CPT | Performed by: FAMILY MEDICINE

## 2018-03-27 ASSESSMENT — PAIN SCALES - GENERAL: PAINLEVEL: MILD PAIN (2)

## 2018-03-27 NOTE — LETTER

## 2018-03-27 NOTE — MR AVS SNAPSHOT
"              After Visit Summary   3/27/2018    Tereso Erickson    MRN: [id number]           Patient Information     Date Of Birth          1980        Visit Information        Provider Department      3/27/2018 9:30 AM Noe Ernst MD [facility]        Today's Diagnoses     LUQ abdominal pain    -  1    Lipid screening        Gastroesophageal reflux disease without esophagitis        Left-sided chest wall pain           Follow-ups after your visit        Who to contact     If you have questions or need follow up information about today's clinic visit or your schedule please contact [facility] directly at [phone].  Normal or non-critical lab and imaging results will be communicated to you by MyChart, letter or phone within 4 business days after the clinic has received the results. If you do not hear from us within 7 days, please contact the clinic through [employer] or phone. If you have a critical or abnormal lab result, we will notify you by phone as soon as possible.  Submit refill requests through [employer] or call your pharmacy and they will forward the refill request to us. Please allow 3 business days for your refill to be completed.          Additional Information About Your Visit        MyChart Information     [employer] gives you secure access to your electronic health record. If you see a primary care provider, you can also send messages to your care team and make appointments. If you have questions, please call your primary care clinic.  If you do not have a primary care provider, please call [phone] and they will assist you.        Care EveryWhere ID     This is your Care EveryWhere ID. This could be used by other organizations to access your [place] medical records  [id number]        Your Vitals Were     Pulse Temperature Respirations Height Pulse Oximetry BMI (Body Mass Index)    63 98.5  F (36.9  C) (Temporal) 18 5' 9\" (1.753 m) 99% 35.44 kg/m2    "    Blood Pressure from Last 3 Encounters:   03/27/18 128/74   12/18/17 118/72   10/11/17 110/70    Weight from Last 3 Encounters:   03/27/18 240 lb (108.9 kg)   12/18/17 237 lb (107.5 kg)   10/11/17 239 lb 9.6 oz (108.7 kg)              We Performed the Following     CBC with platelets differential     Comprehensive metabolic panel     CRP inflammation     Lipase     Lipid panel reflex to direct LDL Fasting        Primary Care Provider Office Phone # Fax #    Noe Ernst [phone] [address] [place] [address]        Equal Access to Services     [facility]: Hadii spencer thomas hadasho Soyary, waaxda luqadaha, qaybta kaalmada adelalyyada, trini quinonez . So [facility] [phone].    ATENCIÓN: Si habla español, tiene a smith disposición servicios gratuitos de asistencia lingüística. Llame al [phone].    We comply with applicable federal civil rights laws and [place] laws. We do not discriminate on the basis of race, color, national origin, age, disability, sex, sexual orientation, or gender identity.            Thank you!     Thank you for choosing [facility]  for your care. Our goal is always to provide you with excellent care. Hearing back from our patients is one way we can continue to improve our services. Please take a few minutes to complete the written survey that you may receive in the mail after your visit with us. Thank you!             Your Updated Medication List - Protect others around you: Learn how to safely use, store and throw away your medicines at www.disposemymeds.org.          This list is accurate as of 3/27/18  4:06 PM.  Always use your most recent med list.                   Brand Name Dispense Instructions for use Diagnosis    ACETAMINOPHEN PO      Take 650 mg by mouth every 4 hours as needed for pain        diclofenac 50 MG EC tablet    VOLTAREN    60 tablet    Take 1 tablet (50 mg) by mouth 3 times daily as  needed for moderate pain    Medial epicondylitis of elbow, right, Medial epicondylitis of elbow, left       IBUPROFEN PO

## 2018-03-27 NOTE — NURSING NOTE
"Chief Complaint   Patient presents with     Abdominal Pain     LUQ pain       Initial /74  Pulse 63  Temp 98.5  F (36.9  C) (Temporal)  Resp 18  Ht 5' 9\" (1.753 m)  Wt 240 lb (108.9 kg)  SpO2 99%  BMI 35.44 kg/m2 Estimated body mass index is 35.44 kg/(m^2) as calculated from the following:    Height as of this encounter: 5' 9\" (1.753 m).    Weight as of this encounter: 240 lb (108.9 kg).  Medication Reconciliation: complete    "

## 2018-03-27 NOTE — PROGRESS NOTES
SUBJECTIVE:   Tereso Erickson is a 37 year old male who presents to clinic today for the following health issues:      ABDOMINAL PAIN     Onset: off and on     Description:   Character: Sharp, Dull ache and Fullness  Location: left upper quadrant shoots to back   Radiation: Back    Intensity: moderate    Progression of Symptoms:  worsening    Accompanying Signs & Symptoms:  Fever/Chills?: no   Gas/Bloating: YES, no gas  Nausea: no   Vomitting: no   Diarrhea?: no   Constipation:no   Dysuria or Hematuria: no    History:   Trauma: no   Previous similar pain: no    Previous tests done: none    Precipitating factors:   Does the pain change with:     Food: YES , sometimes     BM: no     Urination: no     Alleviating factors:  Acid reducers     Therapies Tried and outcome:     LMP:  not applicable           Problem list and histories reviewed & adjusted, as indicated.  Additional history: as documented        Reviewed and updated as needed this visit by clinical staff  Tobacco  Allergies  Meds  Problems  Soc Hx      Reviewed and updated as needed this visit by Provider  Allergies  Meds  Problems         Symptoms as noted above.  Been going on for about 3 months and it finally has worsened in severity to the point that the patient would like to get this further evaluated to make sure that it is nothing serious.  Patient saw me for this originally at the time that it started him we thought that perhaps it was GERD.  We had him take ranitidine 1-2 weeks as well as some MiraLAX as we thought perhaps this could also be chronic constipation.  He has not felt that either of these measures have helped much.    There is some discomfort and fullness associated with eating.  He does not have any cramping, explosive diarrhea, gassiness, or blood in his stools or melena.  However, patient notes that he has 1-2 bowel movements a day and they are quite small and they are fairly hard.    Patient also notes some mild discomfort up  [facility] [place]   Psychiatric Progress Note      Impression:   This is a 12 year old female admitted for out of control behaviors.  We are adjusting medications to target impulsivity, aggression and poor frustration tolerance.  We are also working with the patient on therapeutic skill building.         Diagnoses and Plan:     Principal Diagnosis: ASD, DMDD, ADHD  Unit: 7ITC  Attending: Francisco Javier  Medications: risks/benefits discussed with guardian/patient  - titrate Tenex to 0.5mg TID  - titrated Abilify to 20mg qHS  - started Atarax 10mg TID  Laboratory/Imaging:  - CBC wnl, TSH wnl, COMP wnl except for elevated fasting glucose and elevated lipids, specifically TG  Consults:  - Peds for viral GI (earlier in admission)  Patient will be treated in therapeutic milieu with appropriate individual and group therapies as described.  Family Assessment reviewed    Secondary psychiatric diagnoses of concern this admission:  none    Medical diagnoses to be addressed this admission:   1. Viral GI illness  - resolved  2. Elevated fasting glucose and TG  - diet/exercise changes  - to follow up with PCP    Relevant psychosocial stressors: school    Legal Status: Voluntary    Safety Assessment:   Checks: Status 15  Precautions: Assault  Elopement  Pt has required locked seclusion or restraints in the past 24 hours to maintain safety, please refer to RN documentation for further details.    The risks, benefits, alternatives and side effects have been discussed and are understood by the patient and other caregivers.     Anticipated Disposition/Discharge Date: 11/30/17  Target symptoms to stabilize: aggression, irritable and impulsive  Target disposition: home, psychiatrist and therapist    Attestation:  Patient has been seen and evaluated by me,  Bridgette Cortez MD          Interim History:   The patient's care was discussed with the treatment team and chart notes were reviewed.    Side effects to  "into his left lateral chest wall.    ROS:  10 point ROS of systems including Constitutional, Eyes, HENT, Respiratory, Cardiovascular, Gastroenterology, Genitourinary, Integumentary, Muscularskeletal, Psychiatric were all negative except for pertinent positives noted in my HPI.     OBJECTIVE:   /74  Pulse 63  Temp 98.5  F (36.9  C) (Temporal)  Resp 18  Ht 5' 9\" (1.753 m)  Wt 240 lb (108.9 kg)  SpO2 99%  BMI 35.44 kg/m2  Body mass index is 35.44 kg/(m^2).  Physical Exam   Constitutional: He appears well-developed and well-nourished.   Cardiovascular: Normal rate, regular rhythm, S1 normal, S2 normal and normal heart sounds.    No murmur heard.  Pulmonary/Chest: Effort normal and breath sounds normal. No respiratory distress. He has no wheezes. He has no rhonchi. He has no rales.   Abdominal: Soft. Normal appearance and bowel sounds are normal. He exhibits no mass. There is no hepatosplenomegaly. There is tenderness in the epigastric area and left upper quadrant. There is no rigidity, no rebound, no guarding, no CVA tenderness, no tenderness at McBurney's point and negative Bui's sign.   Negative for right lower quadrant, right upper quadrant pain.     Musculoskeletal:   Tenderness to palpation of his left lateral lower chest.   Neurological: He is alert.       ASSESSMENT/PLAN:       ICD-10-CM    1. LUQ abdominal pain R10.12 CRP inflammation     Comprehensive metabolic panel     CBC with platelets differential     Lipase   2. Lipid screening Z13.220 Lipid panel reflex to direct LDL Fasting   3. Severe obesity (BMI 35.0-39.9) with comorbidity (H) E66.01    4. Gastroesophageal reflux disease without esophagitis K21.9 Lipase   5. Left-sided chest wall pain R07.89 CRP inflammation     Comprehensive metabolic panel     CBC with platelets differential     Lipase     PLAN:  1.  Lab evaluation as noted above for further evaluation of his abdominal pain.  2.  Given the location of his abdominal pain we will have " "medication: denies  Sleep: slept through the night  Intake: eating/drinking without difficulty  Groups: attending groups, participating, impulsive behaviors and easily agitated by peers  Peer interactions: gets along well with peers and easily agitated by peers    At times, patient is able to be collaborative with peers and engage with them positively.  In groups, can be quite pleasant and cooperative.  Struggles when she does not get what she wants or is interrupted from pleasurable activity.  Some hitting of staff this morning.  Needed seclusion last evening for aggression to staff.  There is some improvement since admission, will continue to adjust meds and attempt to have smooth transition to outpatient programming.    The 10 point Review of Systems is negative other than noted in the HPI         Medications:       ARIPiprazole  20 mg Oral Daily at 8 pm     hydrOXYzine  10 mg Oral TID     guanFACINE  0.5 mg Oral BID             Allergies:   No Known Allergies         Psychiatric Examination:   /57  Pulse 60  Temp 98.6  F (37  C) (Oral)  Resp 16  Ht 1.43 m (4' 8.3\")  Wt 43 kg (94 lb 12.8 oz)  SpO2 99%  BMI 21.03 kg/m2  Weight is 94 lbs 12.76 oz  Body mass index is 21.03 kg/(m^2).    Appearance:  awake, alert, adequately groomed, appeared as age stated, mild distress and normal weight  Attitude:  somewhat cooperative  Eye Contact:  poor   Mood:  \"ok\"  Affect:  mood congruent, intensity is normal and reactive, a bit irritable  Speech:  clear, coherent and decreased prosody  Psychomotor Behavior:  no evidence of tardive dyskinesia, dystonia, or tics, fidgeting and intact station, gait and muscle tone  Thought Process:  goal oriented, concrete  Associations:  no loose associations  Thought Content:  no evidence of suicidal ideation or homicidal ideation and no evidence of psychotic thought  Insight:  limited  Judgment:  limited  Oriented to:  time, person, and place  Attention Span and Concentration:  " fair  Recent and Remote Memory:  fair  Language: Able to name objects and Able to repeat phrases  Fund of Knowledge: appropriate  Muscle Strength and Tone: normal  Gait and Station: Normal         Labs:   No results found for this or any previous visit (from the past 24 hour(s)).   him proceed with a bowel prep to see if this is just severe constipation that is causing his symptoms.  He of course will only proceed with this if his laboratory evaluation is within normal limits.    Follow up with Provider -only if pain persists after above treatment measures.  At that point, I would recommend a CT of the abdomen with oral and IV contrast to look for other causes of his abdominal pain.    Noe Ernst MD   [facility]

## 2018-03-28 ENCOUNTER — MYC MEDICAL ADVICE (OUTPATIENT)
Dept: FAMILY MEDICINE | Facility: CLINIC | Age: 38
End: 2018-03-28

## 2018-03-29 NOTE — PROGRESS NOTES
Tereso,  Your results are all normal except that your triglycerides are just a little high and your good cholesterol (HDL) is just a little low.  No other lab findings to suggest a source of your abdominal pain.  Please let me know if you have any questions.    Sincerely,  Dr. Ernst

## 2018-04-08 PROBLEM — E66.01 SEVERE OBESITY (BMI 35.0-39.9) WITH COMORBIDITY (H): Status: ACTIVE | Noted: 2018-04-08

## 2018-09-14 ENCOUNTER — MYC MEDICAL ADVICE (OUTPATIENT)
Dept: FAMILY MEDICINE | Facility: CLINIC | Age: 38
End: 2018-09-14

## 2018-09-14 NOTE — TELEPHONE ENCOUNTER
Tereso Erickson is a 38 year old male who calls with ringing in ear.    NURSING ASSESSMENT:  Description:  Patient is reporting he was coming from his son's soccer game yesterday where it was really windy.  When he got in the car where it was quiet he heard a louder than normal ringing in his ear.  He states he has had tinnitus in the past, but this was louder than normal.  He states he is also having a feeling of his ear being plugged and the sound being muffled like he has his hand over his ear.  Patient is denying the following: use of ASA, pain, foreign body in ear, injury to the area, dizziness, nausea, vomiting, drainage, persistent hearing loss.  Patient is scheduled next week and instructed to call with worsening symptoms.  Patient understands and agrees to this plan.    Onset/duration:  1 day  Precip. factors:  See above  Associated symptoms:  See above  Improves/worsens symptoms:  same  Pain scale (0-10)   0/10    Last exam/Treatment:  3/27/18  Allergies: No Known Allergies    NURSING PLAN: Nursing advice to patient appointment next week if no improvement    RECOMMENDED DISPOSITION:  See within 2 weeks   Will comply with recommendation: Yes  If further questions/concerns or if symptoms do not improve, worsen or new symptoms develop, call your PCP or [facility] as soon as possible.      Guideline used:  Ear Ringing  Telephone Triage Protocols for Nurses, Fifth Edition, Alida Bazan RN

## 2018-09-14 NOTE — TELEPHONE ENCOUNTER
This requires RN triage.  We can find a place to work him in today if this is necessary, but based on his initial report I do not think he will need to be seen today but we could have him follow-up with me next week provided that he has no immediate triage needs.  Will forward to RN pool for further review with patient.    Noe Ernst MD

## 2018-09-17 ENCOUNTER — MYC MEDICAL ADVICE (OUTPATIENT)
Dept: FAMILY MEDICINE | Facility: CLINIC | Age: 38
End: 2018-09-17

## 2019-07-29 ENCOUNTER — MYC MEDICAL ADVICE (OUTPATIENT)
Dept: FAMILY MEDICINE | Facility: CLINIC | Age: 39
End: 2019-07-29

## 2019-11-08 ENCOUNTER — HEALTH MAINTENANCE LETTER (OUTPATIENT)
Age: 39
End: 2019-11-08

## 2020-06-15 ENCOUNTER — MYC MEDICAL ADVICE (OUTPATIENT)
Dept: FAMILY MEDICINE | Facility: CLINIC | Age: 40
End: 2020-06-15

## 2020-06-22 NOTE — TELEPHONE ENCOUNTER
The picture that is shown doesn't initially look worrisome.  However, further assessment via a video or in-person visit would better confirm risk.  I would recommend an appointment, but it does not need to be urgent.  Will have staff notify patient and set up appointment that works with both of our schedules.    Noe Ernst MD

## 2020-06-22 NOTE — TELEPHONE ENCOUNTER
Patient plans to call back and schedule an appointment around August along with his physical. Jazlyn Kaur MA     6/22/2020

## 2020-10-29 ENCOUNTER — ALLIED HEALTH/NURSE VISIT (OUTPATIENT)
Dept: FAMILY MEDICINE | Facility: CLINIC | Age: 40
End: 2020-10-29
Payer: COMMERCIAL

## 2020-10-29 DIAGNOSIS — Z23 NEED FOR PROPHYLACTIC VACCINATION AND INOCULATION AGAINST INFLUENZA: Primary | ICD-10-CM

## 2020-10-29 PROCEDURE — 99207 PR NO CHARGE NURSE ONLY: CPT

## 2020-10-29 PROCEDURE — 90471 IMMUNIZATION ADMIN: CPT

## 2020-10-29 PROCEDURE — 90686 IIV4 VACC NO PRSV 0.5 ML IM: CPT

## 2020-12-06 ENCOUNTER — HEALTH MAINTENANCE LETTER (OUTPATIENT)
Age: 40
End: 2020-12-06

## 2021-08-30 ENCOUNTER — HOSPITAL ENCOUNTER (EMERGENCY)
Facility: CLINIC | Age: 41
Discharge: HOME | End: 2021-08-30
Attending: EMERGENCY MEDICINE | Admitting: EMERGENCY MEDICINE
Payer: COMMERCIAL

## 2021-08-30 VITALS
TEMPERATURE: 98.7 F | RESPIRATION RATE: 14 BRPM | DIASTOLIC BLOOD PRESSURE: 82 MMHG | OXYGEN SATURATION: 98 % | SYSTOLIC BLOOD PRESSURE: 118 MMHG | HEART RATE: 90 BPM

## 2021-08-30 DIAGNOSIS — S61.211A LACERATION OF LEFT INDEX FINGER WITHOUT FOREIGN BODY WITHOUT DAMAGE TO NAIL, INITIAL ENCOUNTER: ICD-10-CM

## 2021-08-30 PROCEDURE — 99282 EMERGENCY DEPT VISIT SF MDM: CPT | Mod: 25 | Performed by: EMERGENCY MEDICINE

## 2021-08-30 PROCEDURE — 90471 IMMUNIZATION ADMIN: CPT | Performed by: EMERGENCY MEDICINE

## 2021-08-30 PROCEDURE — 250N000011 HC RX IP 250 OP 636: Performed by: EMERGENCY MEDICINE

## 2021-08-30 PROCEDURE — 12001 RPR S/N/AX/GEN/TRNK 2.5CM/<: CPT | Performed by: EMERGENCY MEDICINE

## 2021-08-30 PROCEDURE — 90715 TDAP VACCINE 7 YRS/> IM: CPT | Performed by: EMERGENCY MEDICINE

## 2021-08-30 RX ADMIN — CLOSTRIDIUM TETANI TOXOID ANTIGEN (FORMALDEHYDE INACTIVATED), CORYNEBACTERIUM DIPHTHERIAE TOXOID ANTIGEN (FORMALDEHYDE INACTIVATED), BORDETELLA PERTUSSIS TOXOID ANTIGEN (GLUTARALDEHYDE INACTIVATED), BORDETELLA PERTUSSIS FILAMENTOUS HEMAGGLUTININ ANTIGEN (FORMALDEHYDE INACTIVATED), BORDETELLA PERTUSSIS PERTACTIN ANTIGEN, AND BORDETELLA PERTUSSIS FIMBRIAE 2/3 ANTIGEN 0.5 ML: 5; 2; 2.5; 5; 3; 5 INJECTION, SUSPENSION INTRAMUSCULAR at 19:15

## 2021-08-30 NOTE — ED TRIAGE NOTES
Here with laceration to pointer finger of left hand. States he cut it with a knife while  frozen burgers.

## 2021-09-07 ENCOUNTER — ALLIED HEALTH/NURSE VISIT (OUTPATIENT)
Dept: FAMILY MEDICINE | Facility: CLINIC | Age: 41
End: 2021-09-07
Payer: COMMERCIAL

## 2021-09-07 DIAGNOSIS — Z48.02 VISIT FOR SUTURE REMOVAL: Primary | ICD-10-CM

## 2021-09-07 PROCEDURE — 99207 PR NO CHARGE NURSE ONLY: CPT

## 2021-09-07 NOTE — PROGRESS NOTES
Patient is here on day 8 of sutures on his left pointer finger.  He states the area still bleeds slightly on a daily basis and he is concerned about taking the sutures out too early.      After discussion, we have decided to give it the full 10 days before removing the sutures.  He will come back in 2 days for SR.  Closing this encounter.  Nisa Bazan, SHOAIBN, RN

## 2021-09-09 ENCOUNTER — ALLIED HEALTH/NURSE VISIT (OUTPATIENT)
Dept: FAMILY MEDICINE | Facility: CLINIC | Age: 41
End: 2021-09-09
Payer: COMMERCIAL

## 2021-09-09 DIAGNOSIS — Z48.02 VISIT FOR SUTURE REMOVAL: Primary | ICD-10-CM

## 2021-09-09 PROCEDURE — 99207 PR NO CHARGE NURSE ONLY: CPT

## 2021-09-09 NOTE — PROGRESS NOTES
Tereso ROBY Lunajuana presents to the clinic today for removal of sutures.  The patient has had the sutures in place for 10 days.  There has been no history of infection or drainage.  6 sutures are seen located on the left index finger.  The wound is healing well with no signs of infection.  Tetanus status is up to date.   All sutures were easily removed today.  Routine wound care discussed.  The patient will follow up as needed.    Closing this encounter.  Nisa Bazan, BSN, RN

## 2021-09-13 ENCOUNTER — MYC MEDICAL ADVICE (OUTPATIENT)
Dept: FAMILY MEDICINE | Facility: CLINIC | Age: 41
End: 2021-09-13

## 2021-09-13 NOTE — TELEPHONE ENCOUNTER
This RN took sutures out, the wound looks pretty good.  He is informed to keep the wound covered and soft until it is completely healed so it doesn't crack open.    Closing this encounter.  Nisa Bazan, SHOAIBN, RN

## 2021-09-21 ASSESSMENT — ENCOUNTER SYMPTOMS
CHILLS: 0
MYALGIAS: 0
DIZZINESS: 0
HEMATOCHEZIA: 0
SHORTNESS OF BREATH: 0
HEADACHES: 1
NAUSEA: 0
DIARRHEA: 0
FREQUENCY: 0
PARESTHESIAS: 0
JOINT SWELLING: 1
EYE PAIN: 0
SORE THROAT: 0
FEVER: 0
ARTHRALGIAS: 1
HEMATURIA: 0
WEAKNESS: 0
NERVOUS/ANXIOUS: 1
HEARTBURN: 1
CONSTIPATION: 0
COUGH: 0
DYSURIA: 0
ABDOMINAL PAIN: 0
PALPITATIONS: 0

## 2021-09-21 ASSESSMENT — PATIENT HEALTH QUESTIONNAIRE - PHQ9
10. IF YOU CHECKED OFF ANY PROBLEMS, HOW DIFFICULT HAVE THESE PROBLEMS MADE IT FOR YOU TO DO YOUR WORK, TAKE CARE OF THINGS AT HOME, OR GET ALONG WITH OTHER PEOPLE: SOMEWHAT DIFFICULT
SUM OF ALL RESPONSES TO PHQ QUESTIONS 1-9: 14
SUM OF ALL RESPONSES TO PHQ QUESTIONS 1-9: 14

## 2021-09-22 ASSESSMENT — PATIENT HEALTH QUESTIONNAIRE - PHQ9: SUM OF ALL RESPONSES TO PHQ QUESTIONS 1-9: 14

## 2021-09-23 ENCOUNTER — OFFICE VISIT (OUTPATIENT)
Dept: FAMILY MEDICINE | Facility: CLINIC | Age: 41
End: 2021-09-23
Payer: COMMERCIAL

## 2021-09-23 ENCOUNTER — MYC MEDICAL ADVICE (OUTPATIENT)
Dept: FAMILY MEDICINE | Facility: CLINIC | Age: 41
End: 2021-09-23

## 2021-09-23 VITALS
SYSTOLIC BLOOD PRESSURE: 118 MMHG | WEIGHT: 245.4 LBS | DIASTOLIC BLOOD PRESSURE: 82 MMHG | OXYGEN SATURATION: 95 % | TEMPERATURE: 96.9 F | BODY MASS INDEX: 36.24 KG/M2 | RESPIRATION RATE: 20 BRPM | HEART RATE: 78 BPM

## 2021-09-23 DIAGNOSIS — K21.9 GASTROESOPHAGEAL REFLUX DISEASE WITHOUT ESOPHAGITIS: ICD-10-CM

## 2021-09-23 DIAGNOSIS — E66.01 SEVERE OBESITY (BMI 35.0-39.9) WITH COMORBIDITY (H): ICD-10-CM

## 2021-09-23 DIAGNOSIS — Z11.59 NEED FOR HEPATITIS C SCREENING TEST: ICD-10-CM

## 2021-09-23 DIAGNOSIS — Z00.00 ROUTINE GENERAL MEDICAL EXAMINATION AT A HEALTH CARE FACILITY: Primary | ICD-10-CM

## 2021-09-23 DIAGNOSIS — Z23 NEED FOR PROPHYLACTIC VACCINATION AND INOCULATION AGAINST INFLUENZA: ICD-10-CM

## 2021-09-23 DIAGNOSIS — F41.9 ANXIETY: ICD-10-CM

## 2021-09-23 DIAGNOSIS — Z11.4 ENCOUNTER FOR SCREENING FOR HUMAN IMMUNODEFICIENCY VIRUS (HIV): ICD-10-CM

## 2021-09-23 LAB
CHOLEST SERPL-MCNC: 180 MG/DL
FASTING STATUS PATIENT QL REPORTED: YES
FASTING STATUS PATIENT QL REPORTED: YES
GLUCOSE BLD-MCNC: 94 MG/DL (ref 70–99)
HDLC SERPL-MCNC: 37 MG/DL
HGB BLD-MCNC: 16.3 G/DL (ref 13.3–17.7)
LDLC SERPL CALC-MCNC: 74 MG/DL
NONHDLC SERPL-MCNC: 143 MG/DL
TRIGL SERPL-MCNC: 344 MG/DL
TSH SERPL DL<=0.005 MIU/L-ACNC: 0.66 MU/L (ref 0.4–4)

## 2021-09-23 PROCEDURE — 86803 HEPATITIS C AB TEST: CPT | Performed by: FAMILY MEDICINE

## 2021-09-23 PROCEDURE — 90471 IMMUNIZATION ADMIN: CPT | Performed by: FAMILY MEDICINE

## 2021-09-23 PROCEDURE — 87389 HIV-1 AG W/HIV-1&-2 AB AG IA: CPT | Performed by: FAMILY MEDICINE

## 2021-09-23 PROCEDURE — 99385 PREV VISIT NEW AGE 18-39: CPT | Mod: 25 | Performed by: FAMILY MEDICINE

## 2021-09-23 PROCEDURE — 36415 COLL VENOUS BLD VENIPUNCTURE: CPT | Performed by: FAMILY MEDICINE

## 2021-09-23 PROCEDURE — 82947 ASSAY GLUCOSE BLOOD QUANT: CPT | Performed by: FAMILY MEDICINE

## 2021-09-23 PROCEDURE — 85018 HEMOGLOBIN: CPT | Performed by: FAMILY MEDICINE

## 2021-09-23 PROCEDURE — 80061 LIPID PANEL: CPT | Performed by: FAMILY MEDICINE

## 2021-09-23 PROCEDURE — 90686 IIV4 VACC NO PRSV 0.5 ML IM: CPT | Performed by: FAMILY MEDICINE

## 2021-09-23 PROCEDURE — 84443 ASSAY THYROID STIM HORMONE: CPT | Performed by: FAMILY MEDICINE

## 2021-09-23 PROCEDURE — 99213 OFFICE O/P EST LOW 20 MIN: CPT | Mod: 25 | Performed by: FAMILY MEDICINE

## 2021-09-23 ASSESSMENT — ENCOUNTER SYMPTOMS
COUGH: 0
CONSTIPATION: 0
MYALGIAS: 0
NERVOUS/ANXIOUS: 1
WEAKNESS: 0
JOINT SWELLING: 1
FEVER: 0
HEARTBURN: 1
DIZZINESS: 0
ABDOMINAL PAIN: 0
CHILLS: 0
NAUSEA: 0
DYSURIA: 0
DIARRHEA: 0
PARESTHESIAS: 0
ARTHRALGIAS: 1
PALPITATIONS: 0
FREQUENCY: 0
HEADACHES: 1
SHORTNESS OF BREATH: 0
EYE PAIN: 0
HEMATOCHEZIA: 0
HEMATURIA: 0
SORE THROAT: 0

## 2021-09-23 ASSESSMENT — PAIN SCALES - GENERAL: PAINLEVEL: NO PAIN (0)

## 2021-09-23 NOTE — PROGRESS NOTES
SUBJECTIVE:   CC: Tereso Erickson is an 41 year old male who presents for preventative health visit.       Patient has been advised of split billing requirements and indicates understanding: Yes  Healthy Habits:     Getting at least 3 servings of Calcium per day:  Yes    Bi-annual eye exam:  Yes    Dental care twice a year:  Yes    Sleep apnea or symptoms of sleep apnea:  Daytime drowsiness    Diet:  Regular (no restrictions)    Frequency of exercise:  2-3 days/week    Duration of exercise:  N/A    Taking medications regularly:  No    Medication side effects:  None    PHQ-2 Total Score: 4    Additional concerns today:  No    He wishes to discuss his anxiety/depression symptoms.  He notes that he occasionally is short to anger and has heart palpitations when he gets frustrated.  He does not know if the palpitations happen outside of his anxiety related issues.  He feels stressed all the time.    Today's PHQ-2 Score:   PHQ-2 (  [employer]) 2021   Q1: Little interest or pleasure in doing things 2   Q2: Feeling down, depressed or hopeless 2   PHQ-2 Score 4   Q1: Little interest or pleasure in doing things More than half the days   Q2: Feeling down, depressed or hopeless More than half the days   PHQ-2 Score 4       Abuse: Current or Past(Physical, Sexual or Emotional)- No  Do you feel safe in your environment? Yes    Have you ever done Advance Care Planning? (For example, a Health Directive, POLST, or a discussion with a medical provider or your loved ones about your wishes): No, advance care planning information given to patient to review.  Patient declined advance care planning discussion at this time.    Social History     Tobacco Use     Smoking status: Former Smoker     Packs/day: 1.00     Years: 0.00     Pack years: 0.00     Types: Cigarettes, Dip, chew, snus or snuff     Quit date: 2005     Years since quittin.7     Smokeless tobacco: Former User     Quit date: 2005   Substance Use Topics      Alcohol use: Yes     Comment: occasionally a beer once a month     If you drink alcohol do you typically have >3 drinks per day or >7 drinks per week? No    Alcohol Use 9/23/2021   Prescreen: >3 drinks/day or >7 drinks/week? -   Prescreen: >3 drinks/day or >7 drinks/week?    No flowsheet data found.    Reviewed orders with patient. Reviewed health maintenance and updated orders accordingly - Yes      Reviewed and updated as needed this visit by clinical staff  Tobacco  Allergies  Meds   Med Hx  Surg Hx  Fam Hx  Soc Hx        Reviewed and updated as needed this visit by Provider                    Review of Systems   Constitutional: Negative for chills and fever.   HENT: Negative for congestion, ear pain and sore throat.    Eyes: Negative for pain and visual disturbance.   Respiratory: Negative for cough and shortness of breath.    Cardiovascular: Positive for chest pain. Negative for palpitations and peripheral edema.   Gastrointestinal: Positive for heartburn. Negative for abdominal pain, constipation, diarrhea, hematochezia and nausea.   Genitourinary: Negative for discharge, dysuria, frequency, genital sores, hematuria, impotence and urgency.   Musculoskeletal: Positive for arthralgias and joint swelling. Negative for myalgias.   Skin: Negative for rash.   Neurological: Positive for headaches. Negative for dizziness, weakness and paresthesias.   Psychiatric/Behavioral: Positive for mood changes. The patient is nervous/anxious.          OBJECTIVE:   /82   Pulse 78   Temp 96.9  F (36.1  C) (Temporal)   Resp 20   Wt 111.3 kg (245 lb 6.4 oz)   SpO2 95%   BMI 36.24 kg/m      Physical Exam  Constitutional:       General: He is not in acute distress.     Appearance: He is well-developed. He is obese.   HENT:      Head: Normocephalic and atraumatic.      Right Ear: Hearing, tympanic membrane, ear canal and external ear normal.      Left Ear: Hearing, tympanic membrane, ear canal and external ear normal.       Nose: Nose normal.      Mouth/Throat:      Mouth: No oral lesions.      Pharynx: Uvula midline. No oropharyngeal exudate.   Eyes:      General: Lids are normal. No scleral icterus.        Right eye: No discharge.         Left eye: No discharge.      Conjunctiva/sclera: Conjunctivae normal.      Pupils: Pupils are equal, round, and reactive to light.   Neck:      Thyroid: No thyroid mass or thyromegaly.      Trachea: No tracheal deviation.   Cardiovascular:      Rate and Rhythm: Normal rate and regular rhythm.      Pulses: Normal pulses.      Heart sounds: Normal heart sounds, S1 normal and S2 normal. No murmur heard.   No S3 or S4 sounds.    Pulmonary:      Effort: Pulmonary effort is normal. No respiratory distress.      Breath sounds: Normal breath sounds. No wheezing or rales.   Abdominal:      General: Bowel sounds are normal. There is no distension.      Palpations: Abdomen is soft. There is no mass.      Tenderness: There is no abdominal tenderness. There is no guarding.   Musculoskeletal:         General: No deformity. Normal range of motion.      Cervical back: Normal range of motion and neck supple.   Lymphadenopathy:      Cervical: No cervical adenopathy.      Upper Body:      Right upper body: No supraclavicular adenopathy.      Left upper body: No supraclavicular adenopathy.   Skin:     General: Skin is warm and dry.      Findings: No lesion or rash.   Neurological:      Mental Status: He is alert and oriented to person, place, and time.      Motor: No abnormal muscle tone.      Deep Tendon Reflexes: Reflexes are normal and symmetric.   Psychiatric:         Mood and Affect: Mood is anxious.         Speech: Speech normal.         Thought Content: Thought content normal.         Judgment: Judgment normal.               ASSESSMENT/PLAN:     ASSESSMENT/ORDERS:    ICD-10-CM    1. Routine general medical examination at a health care facility  Z00.00    2. Encounter for screening for human immunodeficiency  "virus (HIV)  Z11.4 HIV Antigen Antibody Combo     HIV Antigen Antibody Combo   3. Need for hepatitis C screening test  Z11.59 Hepatitis C Screen Reflex to HCV RNA Quant and Genotype     Hepatitis C Screen Reflex to HCV RNA Quant and Genotype   4. Gastroesophageal reflux disease without esophagitis  K21.9    5. Severe obesity (BMI 35.0-39.9) with comorbidity (H)  E66.01 Glucose     Lipid panel reflex to direct LDL Fasting     Glucose     Lipid panel reflex to direct LDL Fasting   6. Anxiety  F41.9 TSH with free T4 reflex     Hemoglobin     TSH with free T4 reflex     Hemoglobin   7. Need for prophylactic vaccination and inoculation against influenza  Z23 INFLUENZA VACCINE IM > 6 MONTHS VALENT IIV4 (AFLURIA/FLUZONE)     PLAN:  1.  Discussed how his GERD can be affected by his weight.  discussed increasing exercise and dietary improvement for weight loss and improving GERD symptoms.   2.  Discussed self help treatment for depression/anxiety.  He will follow-up if he requires further evaluation or treatment.  Labs as noted above.    Patient has been advised of split billing requirements and indicates understanding: Yes  COUNSELING:   Reviewed preventive health counseling, as reflected in patient instructions    Estimated body mass index is 36.24 kg/m  as calculated from the following:    Height as of 3/27/18: 1.753 m (5' 9\").    Weight as of this encounter: 111.3 kg (245 lb 6.4 oz).     Weight management plan: Discussed healthy diet and exercise guidelines    He reports that he quit smoking about 16 years ago. His smoking use included cigarettes and dip, chew, snus or snuff. He smoked 1.00 pack per day for 0.00 years. He quit smokeless tobacco use about 16 years ago.      Counseling Resources:  ATP IV Guidelines  Pooled Cohorts Equation Calculator  FRAX Risk Assessment  ICSI Preventive Guidelines  Dietary Guidelines for Americans, 2010  [employer]'s MyPlate  ASA Prophylaxis  Lung CA Screening    Noe Ernst MD  M " [facility]

## 2021-09-23 NOTE — PATIENT INSTRUCTIONS
Preventive Health Recommendations  Male Ages 26 - 39    Yearly exam:             See your health care provider every year in order to  o   Review health changes.   o   Discuss preventive care.    o   Review your medicines if your doctor has prescribed any.    You should be tested each year for STDs (sexually transmitted diseases), if you re at risk.     After age 35, talk to your provider about cholesterol testing. If you are at risk for heart disease, have your cholesterol tested at least every 5 years.     If you are at risk for diabetes, you should have a diabetes test (fasting glucose).  Shots: Get a flu shot each year. Get a tetanus shot every 10 years.     Nutrition:    Eat at least 5 servings of fruits and vegetables daily.     Eat whole-grain bread, whole-wheat pasta and brown rice instead of white grains and rice.     Get adequate Calcium and Vitamin D.     Lifestyle    Exercise for at least 150 minutes a week (30 minutes a day, 5 days a week). This will help you control your weight and prevent disease.     Limit alcohol to one drink per day.     No smoking.     Wear sunscreen to prevent skin cancer.     See your dentist every six months for an exam and cleaning.     My Fitness Pal:  Load this onto your smart phone or go online.  [email or web address]. Make your goal of 1-2 pounds of weight loss per week.     Look into mindfulness training apps for your phone.  Calm and Headspace are 2 of the most common ones.      Ez Gonzalez MD:  Stress Remedy - Relaxation on the Run:  [email or web address]

## 2021-09-24 LAB
HCV AB SERPL QL IA: NONREACTIVE
HIV 1+2 AB+HIV1 P24 AG SERPL QL IA: NONREACTIVE

## 2021-09-29 NOTE — RESULT ENCOUNTER NOTE
Rashard,  Your results show your triglycerides continue to be too elevated.  This should improve with exercise and diet modifications to include more whole grain, fruits/vegetables.  Please let me know if you have any questions.    Sincerely,  Dr. Ernst

## 2022-08-29 ENCOUNTER — MYC MEDICAL ADVICE (OUTPATIENT)
Dept: FAMILY MEDICINE | Facility: CLINIC | Age: 42
End: 2022-08-29

## 2022-09-02 ENCOUNTER — OFFICE VISIT (OUTPATIENT)
Dept: FAMILY MEDICINE | Facility: CLINIC | Age: 42
End: 2022-09-02
Payer: COMMERCIAL

## 2022-09-02 VITALS
OXYGEN SATURATION: 97 % | BODY MASS INDEX: 33.21 KG/M2 | HEIGHT: 71 IN | WEIGHT: 237.25 LBS | DIASTOLIC BLOOD PRESSURE: 70 MMHG | HEART RATE: 69 BPM | SYSTOLIC BLOOD PRESSURE: 124 MMHG | TEMPERATURE: 97.6 F

## 2022-09-02 DIAGNOSIS — M77.01 MEDIAL EPICONDYLITIS OF ELBOW, RIGHT: ICD-10-CM

## 2022-09-02 DIAGNOSIS — M77.11 LATERAL EPICONDYLITIS OF RIGHT ELBOW: Primary | ICD-10-CM

## 2022-09-02 PROBLEM — E66.01 SEVERE OBESITY (BMI 35.0-39.9) WITH COMORBIDITY (H): Status: RESOLVED | Noted: 2018-04-08 | Resolved: 2022-09-02

## 2022-09-02 PROCEDURE — 99213 OFFICE O/P EST LOW 20 MIN: CPT | Performed by: FAMILY MEDICINE

## 2022-09-02 ASSESSMENT — PATIENT HEALTH QUESTIONNAIRE - PHQ9
10. IF YOU CHECKED OFF ANY PROBLEMS, HOW DIFFICULT HAVE THESE PROBLEMS MADE IT FOR YOU TO DO YOUR WORK, TAKE CARE OF THINGS AT HOME, OR GET ALONG WITH OTHER PEOPLE: SOMEWHAT DIFFICULT
SUM OF ALL RESPONSES TO PHQ QUESTIONS 1-9: 9
5. POOR APPETITE OR OVEREATING: SEVERAL DAYS
SUM OF ALL RESPONSES TO PHQ QUESTIONS 1-9: 9

## 2022-09-02 ASSESSMENT — ANXIETY QUESTIONNAIRES
7. FEELING AFRAID AS IF SOMETHING AWFUL MIGHT HAPPEN: NOT AT ALL
5. BEING SO RESTLESS THAT IT IS HARD TO SIT STILL: NOT AT ALL
6. BECOMING EASILY ANNOYED OR IRRITABLE: SEVERAL DAYS
1. FEELING NERVOUS, ANXIOUS, OR ON EDGE: SEVERAL DAYS
2. NOT BEING ABLE TO STOP OR CONTROL WORRYING: NOT AT ALL
IF YOU CHECKED OFF ANY PROBLEMS ON THIS QUESTIONNAIRE, HOW DIFFICULT HAVE THESE PROBLEMS MADE IT FOR YOU TO DO YOUR WORK, TAKE CARE OF THINGS AT HOME, OR GET ALONG WITH OTHER PEOPLE: SOMEWHAT DIFFICULT
GAD7 TOTAL SCORE: 4
GAD7 TOTAL SCORE: 4
3. WORRYING TOO MUCH ABOUT DIFFERENT THINGS: SEVERAL DAYS

## 2022-09-02 ASSESSMENT — PAIN SCALES - GENERAL: PAINLEVEL: SEVERE PAIN (7)

## 2022-09-02 NOTE — PROGRESS NOTES
"  Assessment & Plan     ASSESSMENT/ORDERS:    ICD-10-CM    1. Lateral epicondylitis of right elbow  M77.11 Occupational Therapy Referral   2. Medial epicondylitis of elbow, right  M77.01 Occupational Therapy Referral     PLAN:  1.  Referral to occupational therapy.  Tennis elbow strap given today.  Follow-up only if symptoms do not improve.  Discussed possible corticosteroid injection if this happens.              BMI:   Estimated body mass index is 33.56 kg/m  as calculated from the following:    Height as of this encounter: 1.791 m (5' 10.5\").    Weight as of this encounter: 107.6 kg (237 lb 4 oz).           Return in about 6 weeks (around 10/14/2022) for recheck right elbow.    Noe Ernst MD  [facility] MEMO Wetzel is a 42 year old, presenting for the following health issues:  Elbow Pain      Elbow Pain    History of Present Illness       Reason for visit:  Extreme elbow pain  Symptom onset:  More than a month  Symptoms include:  Extreme elbow pain.  makes my whole arm stiff and sore.  even goes into my back now and I'm losing sleep as well because of it  Symptom intensity:  Severe  Symptom progression:  Worsening  Had these symptoms before:  Yes  Has tried/received treatment for these symptoms:  Yes  Previous treatment was successful:  No  What makes it worse:  Using my arm to do nearly anything  What makes it better:  No    He eats 0-1 servings of fruits and vegetables daily.He consumes 1 sweetened beverage(s) daily.He exercises with enough effort to increase his heart rate 9 or less minutes per day.  He exercises with enough effort to increase his heart rate 3 or less days per week.   He is taking medications regularly.    Today's PHQ-9         PHQ-9 Total Score: 9    PHQ-9 Q9 Thoughts of better off dead/self-harm past 2 weeks :   Not at all    How difficult have these problems made it for you to do your work, take care of things at home, or get along with other " "people: Somewhat difficult       Pain History:  When did you first notice your pain? - Chronic Pain   Have you seen this provider for your pain in the past?   Yes   Where in your body do you have pain? Right elbow, shoulder, and back   Are you seeing anyone else for your pain? No    PHQ-9 SCORE 9/21/2021 9/2/2022   PHQ-9 Total Score MyChart 14 (Moderate depression) 9 (Mild depression)   PHQ-9 Total Score 14 9             TUSHAR-7 SCORE 9/2/2022   Total Score 4     PEG Score 9/2/2022   PEG Total Score 7         PDMP Review     None        Last CSA Agreement:   CSA -- Patient Level:    CSA: None found at the patient level.       Last UDS:           Pain started 3-4 months ago.  Worse in past month.  Pain at lateral epicondyle and medial side of elbow.  Cannot lift gallon jug of milk without pain.  Pain with grasp and pronation and supination.      .  Throwing a lot this summer.  Seems to have worsened after baseball season is done.    No trauma.    Review of Systems         Objective    /70   Pulse 69   Temp 97.6  F (36.4  C) (Temporal)   Ht 1.791 m (5' 10.5\")   Wt 107.6 kg (237 lb 4 oz)   SpO2 97%   BMI 33.56 kg/m    Body mass index is 33.56 kg/m .  Physical Exam   Right Elbow Exam     Tenderness   The patient is experiencing tenderness in the lateral epicondyle and medial epicondyle.     Range of Motion   The patient has normal right elbow ROM.    Muscle Strength   Right elbow normal strength: painful.    Other   Erythema: absent    Comments:  Pain with grasp                             [unfilled]  [unfilled]  "

## 2022-09-22 ENCOUNTER — HOSPITAL ENCOUNTER (OUTPATIENT)
Dept: OCCUPATIONAL THERAPY | Facility: CLINIC | Age: 42
Setting detail: THERAPIES SERIES
Discharge: HOME | End: 2022-09-22
Attending: FAMILY MEDICINE
Payer: COMMERCIAL

## 2022-09-22 DIAGNOSIS — M77.11 LATERAL EPICONDYLITIS OF RIGHT ELBOW: ICD-10-CM

## 2022-09-22 DIAGNOSIS — M77.01 MEDIAL EPICONDYLITIS OF ELBOW, RIGHT: ICD-10-CM

## 2022-09-22 PROCEDURE — 97110 THERAPEUTIC EXERCISES: CPT | Mod: GO | Performed by: OCCUPATIONAL THERAPIST

## 2022-09-22 PROCEDURE — 97112 NEUROMUSCULAR REEDUCATION: CPT | Mod: GO | Performed by: OCCUPATIONAL THERAPIST

## 2022-09-22 PROCEDURE — 97167 OT EVAL HIGH COMPLEX 60 MIN: CPT | Mod: GO | Performed by: OCCUPATIONAL THERAPIST

## 2022-09-22 PROCEDURE — 97535 SELF CARE MNGMENT TRAINING: CPT | Mod: GO | Performed by: OCCUPATIONAL THERAPIST

## 2022-09-22 NOTE — PROGRESS NOTES
Initial Note: Hand Therapy 09/22/22 0900   General Information/History   Start Of Care Date 09/22/22   Referring Physician Dr. 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   Previous treatment or current condition Pt reports he had issues with overuse about 10 years ago but resolved on its own   How/Where did it occur With repetition/overuse   Onset date of current episode/exacerbation 04/22/22   Surgical procedure none   Chronicity New   Hand Dominance Right   Affected side Right   Functional limitations perform activities of daily living;perform required work activities   Reported Symptoms Pain;Loss of Motion/Stiffness;Loss of strength;Edema   Prior level of function Independent ADL   Assistive devices Wearing tennis elbow strap   Important Activities Coaching ball sports   Level of functions comments Has reduced use and reachign since July   [place]   Patient role/Employment history Employed   Occupation Land Survey   Employment Status Working in normal job without restrictions   Primary Job Tasks Keyboarding   Occupation Comments Switched to home office with pandemic   Patient/Family goals statement Throw ball for sports, resistive tool use   General observations Wearing tennis elbow strap Aircast   Fall Risk Screen   Fall screen completed by OT   Have you fallen 2 or more times in the past year? No   Have you fallen and had an injury in the past year? No   Abuse Screen (yes response referral indicated)   Feels Unsafe at Home or Work/School no   Feels Threatened by Someone no   Does Anyone Try to Keep You From Having Contact with Others or Doing Things Outside Your Home? no   Physical Signs of Abuse  Present no   Pain   Pain Primary Pain Report   Primary Pain Report   Location Lateral epicondyle radiating proximal in radial nerve distribution   Radiation Upper Arm And Cervical Spine   Pain Quality Sharp;Shooting;Aching;Burning   Frequency Constant  (Level of pain fluxates from ache to burning, sharp during flare)   Scale 4/10;10/10  (low 4/10 high 10/10 drops things even sometime even dizzy from pain)   Pain Is Worse During The Day   Pain Is Exacerbated By Gripping;Pushing;Carrying;Using Arm At Shoulder Level;Lifting   Pain Is Relieved By Rest;Ice  (Has taken anti-inflammatory, using Ice with inflamation helps some)   Progression Since Onset Unchanged   Edema   Overall  - Left None   Overall  - Right Severe  (inflamation around lateral epicondyle tender to touch)   Tenderness   Overall - Left Non tender   Overall - Right Sigificant, jump to light touch   Palpation   Palpation Assessed   Right Hand Tenderness Present At: ECRB Insertion   Right Hand Palpation Comments Very tender   ROM   ROM AROM   AROM   AROM Elbow;Wrist   Wrist   Wrist Extension - Left 75   Wrist Extension - Right 50   Wrist Flexion- Left 45   Wrist Flexion - Right 30   Wrist Supination- Left 70   Wrist Supination - Right 50   Wrist Pronation- Left 90   Wrist Pronation - Right 90   Radial Deviation- Left 25   Radial Deviation - Right 20   Ulnar Deviation- Left 45   Ulnar Deviation - Right 30   Elbow   Elbow Extension- Left 5   Elbow Extension - Right 15   Elbow Flexion- Left 150   Elbow Flexion - Right 140   Special Tests   Special Tests Assessed   Right Hand Positive For The Following Special Tests Tinel's Radial NV Pin;Resisted Long Finger;ULTT Radial NV Bias   Right Hand Special Tests Comments Positive Radial Nerve Irritability   Strength   Strength Strength    Avg - Left 105    Avg - Right 25  (Pain with )    Avg Comments Right side significantly below norms    Elbow Extended Avg - Left 90    Elbow Extended Avg -  Right 15    Elbow Extended Avg Comments Significant increase in pain with muscle/nerve elongation   Resisted Muscle Testing   Resisted Muscle Testing Supination;Pronation;Wrist;Radial Deviation   Wrist   Wrist Flexion (Neutral) - Left Strong/pain Free   Wrist Flexion (Neutral) - Right Weak/painful  (Pain with stretch of extensor wad)   Wrist Flexion w/Radial Deviation - Left Strong/pain Free   Wrist Flexion w/Radial Deviation - Right Weak/painful   Wrist Extension (Neutral) - Left Strong/pain Free   Wrist Extension (Neutral) - Right Weak/painful   Wrist Extension w/Radial Deviation - Left Strong/pain Free   Wrist Extension w/Radial Deviation - Right Weak/painful   Supination   Supination - Left Strong/pain Free   Supination - Right Weak/painful   Pronation   Pronation - Left Strong/pain Free   Pronation - Right Strong/pain Free   Therapy Interventions   Planned Therapy Interventions Ultrasound;ROM;Strengthening;Stretching;Manual Therapy;Splinting   Clinical Impression   Criteria for Skilled Therapeutic Interventions Met yes   OT Diagnosis Lateral Epicondyitis with radial nerve   Influenced by the following impairments Pain;Decreased range of motion;Decreased strength;Edema   Assessment of Occupational Performance 5 or more Performance Deficits   Identified Performance Deficits reaching, gripping, pushing, pulling, throwing sports, leisure, ADL, Cooking, Dressing,   Clinical Decision Making (Complexity) High complexity   Therapy Frequency 1-2 x per week   Predicted Duration of Therapy Intervention (days/wks) 12 weeks   Risks and Benefits of Treatment have been explained. Yes   Patient, Family & other staff in agreement with plan of care Yes   Clinical Impression Comments Severe   Hand Eval Goals   Hand Eval Goals 2;1;3   Hand Goal 1   Goal Identifier AROM   Goal Description PT will have full AROM in elbow and forearm allowing pt to reach with mild level symptoms   Target Date 11/22/22   Hand Goal 2   Goal  Identifier Strength   Goal Description Pt will be able to  tool with mild level difficulty without pain   Target Date 11/22/22   Hand Goal 3   Goal Identifier Pain   Goal Description Pt will be able to throw ball to sons without pain   Target Date 12/22/22   Total Evaluation Time   OT Wilbert High Complexity Minutes (34105) 30

## 2022-09-26 ENCOUNTER — HOSPITAL ENCOUNTER (OUTPATIENT)
Dept: OCCUPATIONAL THERAPY | Facility: CLINIC | Age: 42
Setting detail: THERAPIES SERIES
Discharge: HOME | End: 2022-09-26
Attending: FAMILY MEDICINE
Payer: COMMERCIAL

## 2022-09-26 PROCEDURE — 97140 MANUAL THERAPY 1/> REGIONS: CPT | Mod: GO | Performed by: OCCUPATIONAL THERAPIST

## 2022-09-26 PROCEDURE — 97110 THERAPEUTIC EXERCISES: CPT | Mod: GO | Performed by: OCCUPATIONAL THERAPIST

## 2022-09-26 PROCEDURE — 97112 NEUROMUSCULAR REEDUCATION: CPT | Mod: GO | Performed by: OCCUPATIONAL THERAPIST

## 2022-10-03 ENCOUNTER — HOSPITAL ENCOUNTER (OUTPATIENT)
Dept: OCCUPATIONAL THERAPY | Facility: CLINIC | Age: 42
Setting detail: THERAPIES SERIES
Discharge: HOME | End: 2022-10-03
Attending: FAMILY MEDICINE
Payer: COMMERCIAL

## 2022-10-03 PROCEDURE — 97140 MANUAL THERAPY 1/> REGIONS: CPT | Mod: GO | Performed by: OCCUPATIONAL THERAPIST

## 2022-10-03 PROCEDURE — 97112 NEUROMUSCULAR REEDUCATION: CPT | Mod: GO | Performed by: OCCUPATIONAL THERAPIST

## 2022-10-03 PROCEDURE — 97110 THERAPEUTIC EXERCISES: CPT | Mod: GO | Performed by: OCCUPATIONAL THERAPIST

## 2022-10-10 ENCOUNTER — HOSPITAL ENCOUNTER (OUTPATIENT)
Dept: OCCUPATIONAL THERAPY | Facility: CLINIC | Age: 42
Setting detail: THERAPIES SERIES
Discharge: HOME | End: 2022-10-10
Attending: FAMILY MEDICINE
Payer: COMMERCIAL

## 2022-10-10 PROCEDURE — 97112 NEUROMUSCULAR REEDUCATION: CPT | Mod: GO | Performed by: OCCUPATIONAL THERAPIST

## 2022-10-10 PROCEDURE — 97140 MANUAL THERAPY 1/> REGIONS: CPT | Mod: GO | Performed by: OCCUPATIONAL THERAPIST

## 2022-10-10 PROCEDURE — 97110 THERAPEUTIC EXERCISES: CPT | Mod: GO | Performed by: OCCUPATIONAL THERAPIST

## 2022-10-23 ENCOUNTER — OFFICE VISIT (OUTPATIENT)
Dept: URGENT CARE | Facility: URGENT CARE | Age: 42
End: 2022-10-23
Payer: COMMERCIAL

## 2022-10-23 VITALS
TEMPERATURE: 97.7 F | OXYGEN SATURATION: 96 % | HEART RATE: 80 BPM | SYSTOLIC BLOOD PRESSURE: 114 MMHG | RESPIRATION RATE: 16 BRPM | DIASTOLIC BLOOD PRESSURE: 80 MMHG

## 2022-10-23 DIAGNOSIS — R21 RASH AND NONSPECIFIC SKIN ERUPTION: Primary | ICD-10-CM

## 2022-10-23 PROCEDURE — 99213 OFFICE O/P EST LOW 20 MIN: CPT | Performed by: FAMILY MEDICINE

## 2022-10-23 RX ORDER — PREDNISONE 20 MG/1
TABLET ORAL
Qty: 20 TABLET | Refills: 0 | Status: SHIPPED | OUTPATIENT
Start: 2022-10-23

## 2022-10-23 NOTE — PROGRESS NOTES
Assessment & Plan     Rash and nonspecific skin eruption  Differentials discussed in detail including dermatitis, allergic reaction due to poison ivy.  Prednisone tapering dose prescribed, common side effect discussed.  Recommended over-the-counter antihistamine and to follow-up in 2 to 3 days or earlier if needed.  Patient understood and in agreement with above plan.  All question answered.  - predniSONE (DELTASONE) 20 MG tablet; Take 3 tabs by mouth daily x 3 days, then 2 tabs daily x 3 days, then 1 tab daily x 3 days, then 1/2 tab daily x 3 days.      Robles Peñaloza MD  [facility] [place]    Will Wetzel is a 42 year old presenting for the following health issues:  Rash      HPI     Concern -   Onset: 2 days   Description: 2 days,on face, right hip/back, swollen, redness, itchy-unsure if from poison ivy, was in woods Thursday and yesterday, visited [facility] Friday   Intensity: moderate  Progression of Symptoms:  same  Accompanying Signs & Symptoms: no fever, chills, sore throat, sob or other relevant systemic symptoms   Previous history of similar problem: poison ivy  Therapies tried and outcome: Zanfel       Review of Systems   Constitutional, HEENT, cardiovascular, pulmonary, gi and gu systems are negative, except as otherwise noted.      Objective    /80   Pulse 80   Temp 97.7  F (36.5  C)   Resp 16   SpO2 96%   There is no height or weight on file to calculate BMI.  Physical Exam   GENERAL: alert and no distress  EYES: Eyes grossly normal to inspection, PERRL and conjunctivae and sclerae normal  NECK: no adenopathy, no asymmetry, masses, or scars and thyroid normal to palpation  RESP: no wheezes  ABDOMEN: soft, nontender  SKIN: right eyelid swollen and erythematous rash involving right lower face, lower back, few rashes vesicular in nature with serosanguineous discharge  NEURO: Normal strength and tone, mentation intact and speech normal  PSYCH: mentation  appears normal, affect normal/bright

## 2022-10-26 ENCOUNTER — MYC MEDICAL ADVICE (OUTPATIENT)
Dept: FAMILY MEDICINE | Facility: CLINIC | Age: 42
End: 2022-10-26

## 2022-10-31 ENCOUNTER — HOSPITAL ENCOUNTER (OUTPATIENT)
Dept: OCCUPATIONAL THERAPY | Facility: CLINIC | Age: 42
Setting detail: THERAPIES SERIES
Discharge: HOME | End: 2022-10-31
Attending: FAMILY MEDICINE
Payer: COMMERCIAL

## 2022-10-31 PROCEDURE — 97140 MANUAL THERAPY 1/> REGIONS: CPT | Mod: GO | Performed by: OCCUPATIONAL THERAPIST

## 2022-10-31 PROCEDURE — 97110 THERAPEUTIC EXERCISES: CPT | Mod: GO | Performed by: OCCUPATIONAL THERAPIST

## 2022-10-31 PROCEDURE — 97112 NEUROMUSCULAR REEDUCATION: CPT | Mod: GO | Performed by: OCCUPATIONAL THERAPIST

## 2022-10-31 PROCEDURE — 97530 THERAPEUTIC ACTIVITIES: CPT | Mod: GO | Performed by: OCCUPATIONAL THERAPIST

## 2022-11-14 ENCOUNTER — HOSPITAL ENCOUNTER (OUTPATIENT)
Dept: OCCUPATIONAL THERAPY | Facility: CLINIC | Age: 42
Setting detail: THERAPIES SERIES
Discharge: HOME | End: 2022-11-14
Attending: FAMILY MEDICINE
Payer: COMMERCIAL

## 2022-11-14 PROCEDURE — 97110 THERAPEUTIC EXERCISES: CPT | Mod: GO | Performed by: OCCUPATIONAL THERAPIST

## 2022-11-14 PROCEDURE — 97140 MANUAL THERAPY 1/> REGIONS: CPT | Mod: GO | Performed by: OCCUPATIONAL THERAPIST

## 2022-11-14 NOTE — PROGRESS NOTES
Discharge Note:       11/14/22 1400   Signing Clinician's Name / Credentials   Signing clinician's name / credentials Debbie Kruger, OTR/L, CHT   Session Number   Session Number 6/12   Quick Add   Quick Add  Hand   Hand   Referring Physician Dr. Noe Ernst   Medical Diagnosis Right lateral Epicondylitis   Surgical procedure none   Orders Evaluate And Treat As Indicated   Progress/Recertification   Progress Note Due 10/22/22   Adult OT Eval Goals   Hand Eval Goals 2;1;3   Hand Goal 1   Goal Identifier AROM   Goal Description PT will have full AROM in elbow and forearm allowing pt to reach with mild level symptoms   Goal Progress Full elbow extension achieved   Target Date 11/22/22   Date Met 10/03/22   Hand Goal 2   Goal Identifier Strength   Goal Description Pt will be able to  tool with mild level difficulty without pain   Target Date 11/22/22   Date Met 11/14/22   Hand Goal 3   Goal Identifier Pain   Goal Description Pt will be able to throw ball to sons without pain   Target Date 12/22/22   Date Met 11/14/22   Subjective Report   Subjective Report It really feels normal, I get minor discomfort in full extension and time spent there, but it goes away quickly.   Initial Pain level 10/10  (4/10 low)   Current Pain level 0/10   Objective Measures   Objective Measures Objective Measure 1;Objective Measure 2   ROM Comments full elbow extension   Strength  95  (Extended: 90 Prior 25, 15 extended.)   Objective Measure 1   Objective Measure    Details 95 & 90   Objective Measure 2   Objective Measure ULTT   Details 5/5   Therapeutic Interventions   Therapeutic Interventions Neuromuscular Re-education;Therapeutic Procedure/Exercise;Self Care/Home Management;Manual Therapy;Therapeutic Activity   Self Care/Home Management   Skilled Intervention OT home program establishment   Patient Response Demonstrated comprehension PTRX program   Treatment Detail Heat extensor wad for pain, lateral epicondyle ice massage  for inflammation, preventitve education   Neuromuscular Re-education   Neuromuscular Re-ed Minutes (33608) 5   Skilled Intervention OT radial nerve glide, decompression at the site of PIN and quadrangular space   Treatment Detail RN active/passive glide w/ cups decompression, reviewed   Progress loosened up throughout the UQas well as FA TPs   Therapeutic Procedure/Exercise   Therapeutic Procedure: strength, endurance, ROM, flexibillity minutes (50732) 15   Skilled Intervention stretching extensor wad, wrist ext and RD   Patient Response well   Treatment Detail stretch to extensor wad, PROM. Wrist isotonic extension, isometric RD. Upgraded isotonic 5# WE, painfree. Scap squeezes, chin tucks. TB Rows, extension, rever fly reviewed all for d/c   ROM/AROM   AROM Forearm   (With elbow flexion and extension)   Therapeutic Activity   Therapeutic Activity Minutes (61697) 10   Skilled Intervention return to throwing   Patient Response well   Treatment Detail throwing, flicking, different weights/sized balls   Manual Therapy   Manual Therapy Minutes (14591) 15   Skilled Intervention IASTM and cupping to extensors   Patient Response well   Treatment Detail IASTM, TPR to extensors   Education   Learner Patient   Readiness Eager;Acceptance   Method Booklet/handout;Video;Demonstration   Response Verbalizes understanding;Demonstrates understanding   Plan   Homework PTRX   Home program AROM elbow, no tension on nerve   Updates to plan of care light strengthening   Plan for next session strengthen   Comments   Comments Pt with excellent gains and is nearly 100% back to all activities. He feels confident to cont HEP I'ly as will be life long based on his leisure activities. At this time he is ready for d/c, understands to call with any concerns/questions.   Total Session Time   Timed Code Treatment Minutes 30   Total Treatment Time (sum of timed and untimed services) 30     Debbie Kruger, OTR/L, CHT

## 2022-11-17 ENCOUNTER — ALLIED HEALTH/NURSE VISIT (OUTPATIENT)
Dept: FAMILY MEDICINE | Facility: CLINIC | Age: 42
End: 2022-11-17
Payer: COMMERCIAL

## 2022-11-17 DIAGNOSIS — Z23 NEED FOR PROPHYLACTIC VACCINATION AND INOCULATION AGAINST INFLUENZA: Primary | ICD-10-CM

## 2022-11-17 PROCEDURE — 90686 IIV4 VACC NO PRSV 0.5 ML IM: CPT

## 2022-11-17 PROCEDURE — 99207 PR NO CHARGE NURSE ONLY: CPT

## 2022-11-17 PROCEDURE — 90471 IMMUNIZATION ADMIN: CPT

## 2023-01-07 ENCOUNTER — HEALTH MAINTENANCE LETTER (OUTPATIENT)
Age: 43
End: 2023-01-07

## 2024-02-10 ENCOUNTER — HEALTH MAINTENANCE LETTER (OUTPATIENT)
Age: 44
End: 2024-02-10

## 2025-03-02 ENCOUNTER — HEALTH MAINTENANCE LETTER (OUTPATIENT)
Age: 45
End: 2025-03-02